# Patient Record
Sex: FEMALE | Race: BLACK OR AFRICAN AMERICAN | NOT HISPANIC OR LATINO | URBAN - METROPOLITAN AREA
[De-identification: names, ages, dates, MRNs, and addresses within clinical notes are randomized per-mention and may not be internally consistent; named-entity substitution may affect disease eponyms.]

---

## 2021-01-01 ENCOUNTER — INPATIENT (INPATIENT)
Facility: HOSPITAL | Age: 86
LOS: 3 days | End: 2021-10-04
Attending: INTERNAL MEDICINE | Admitting: INTERNAL MEDICINE
Payer: COMMERCIAL

## 2021-01-01 VITALS
TEMPERATURE: 99 F | HEIGHT: 63 IN | HEART RATE: 113 BPM | WEIGHT: 162.04 LBS | OXYGEN SATURATION: 90 % | RESPIRATION RATE: 26 BRPM | SYSTOLIC BLOOD PRESSURE: 102 MMHG | DIASTOLIC BLOOD PRESSURE: 59 MMHG

## 2021-01-01 VITALS — HEART RATE: 85 BPM | OXYGEN SATURATION: 98 %

## 2021-01-01 DIAGNOSIS — Z45.2 ENCOUNTER FOR ADJUSTMENT AND MANAGEMENT OF VASCULAR ACCESS DEVICE: ICD-10-CM

## 2021-01-01 DIAGNOSIS — Z90.710 ACQUIRED ABSENCE OF BOTH CERVIX AND UTERUS: Chronic | ICD-10-CM

## 2021-01-01 LAB
A1C WITH ESTIMATED AVERAGE GLUCOSE RESULT: 6.5 % — HIGH (ref 4–5.6)
ALBUMIN SERPL ELPH-MCNC: 1.5 G/DL — LOW (ref 3.3–5)
ALBUMIN SERPL ELPH-MCNC: 1.5 G/DL — LOW (ref 3.3–5)
ALBUMIN SERPL ELPH-MCNC: 1.6 G/DL — LOW (ref 3.3–5)
ALP SERPL-CCNC: 203 U/L — HIGH (ref 40–120)
ALP SERPL-CCNC: 204 U/L — HIGH (ref 40–120)
ALP SERPL-CCNC: 208 U/L — HIGH (ref 40–120)
ALT FLD-CCNC: 17 U/L — SIGNIFICANT CHANGE UP (ref 12–78)
ALT FLD-CCNC: 17 U/L — SIGNIFICANT CHANGE UP (ref 12–78)
ALT FLD-CCNC: 19 U/L — SIGNIFICANT CHANGE UP (ref 12–78)
ANION GAP SERPL CALC-SCNC: 10 MMOL/L — SIGNIFICANT CHANGE UP (ref 5–17)
ANION GAP SERPL CALC-SCNC: 12 MMOL/L — SIGNIFICANT CHANGE UP (ref 5–17)
ANION GAP SERPL CALC-SCNC: 5 MMOL/L — SIGNIFICANT CHANGE UP (ref 5–17)
ANION GAP SERPL CALC-SCNC: 7 MMOL/L — SIGNIFICANT CHANGE UP (ref 5–17)
ANION GAP SERPL CALC-SCNC: 8 MMOL/L — SIGNIFICANT CHANGE UP (ref 5–17)
ANISOCYTOSIS BLD QL: SLIGHT — SIGNIFICANT CHANGE UP
APPEARANCE UR: CLEAR — SIGNIFICANT CHANGE UP
AST SERPL-CCNC: 37 U/L — SIGNIFICANT CHANGE UP (ref 15–37)
AST SERPL-CCNC: 38 U/L — HIGH (ref 15–37)
AST SERPL-CCNC: 48 U/L — HIGH (ref 15–37)
BACTERIA # UR AUTO: ABNORMAL
BASOPHILS # BLD AUTO: 0 K/UL — SIGNIFICANT CHANGE UP (ref 0–0.2)
BASOPHILS # BLD AUTO: 0.07 K/UL — SIGNIFICANT CHANGE UP (ref 0–0.2)
BASOPHILS # BLD AUTO: 0.11 K/UL — SIGNIFICANT CHANGE UP (ref 0–0.2)
BASOPHILS NFR BLD AUTO: 0 % — SIGNIFICANT CHANGE UP (ref 0–2)
BASOPHILS NFR BLD AUTO: 0.3 % — SIGNIFICANT CHANGE UP (ref 0–2)
BASOPHILS NFR BLD AUTO: 0.4 % — SIGNIFICANT CHANGE UP (ref 0–2)
BILIRUB SERPL-MCNC: 0.7 MG/DL — SIGNIFICANT CHANGE UP (ref 0.2–1.2)
BILIRUB SERPL-MCNC: 0.8 MG/DL — SIGNIFICANT CHANGE UP (ref 0.2–1.2)
BILIRUB SERPL-MCNC: 0.9 MG/DL — SIGNIFICANT CHANGE UP (ref 0.2–1.2)
BILIRUB UR-MCNC: NEGATIVE — SIGNIFICANT CHANGE UP
BUN SERPL-MCNC: 24 MG/DL — HIGH (ref 7–23)
BUN SERPL-MCNC: 25 MG/DL — HIGH (ref 7–23)
BUN SERPL-MCNC: 31 MG/DL — HIGH (ref 7–23)
BUN SERPL-MCNC: 37 MG/DL — HIGH (ref 7–23)
BUN SERPL-MCNC: 41 MG/DL — HIGH (ref 7–23)
CALCIUM SERPL-MCNC: 7.8 MG/DL — LOW (ref 8.5–10.1)
CALCIUM SERPL-MCNC: 8.9 MG/DL — SIGNIFICANT CHANGE UP (ref 8.5–10.1)
CALCIUM SERPL-MCNC: 9 MG/DL — SIGNIFICANT CHANGE UP (ref 8.5–10.1)
CALCIUM SERPL-MCNC: 9.1 MG/DL — SIGNIFICANT CHANGE UP (ref 8.5–10.1)
CALCIUM SERPL-MCNC: 9.4 MG/DL — SIGNIFICANT CHANGE UP (ref 8.5–10.1)
CHLORIDE SERPL-SCNC: 107 MMOL/L — SIGNIFICANT CHANGE UP (ref 96–108)
CHLORIDE SERPL-SCNC: 113 MMOL/L — HIGH (ref 96–108)
CHLORIDE SERPL-SCNC: 114 MMOL/L — HIGH (ref 96–108)
CHLORIDE SERPL-SCNC: 115 MMOL/L — HIGH (ref 96–108)
CHLORIDE SERPL-SCNC: 116 MMOL/L — HIGH (ref 96–108)
CO2 SERPL-SCNC: 14 MMOL/L — LOW (ref 22–31)
CO2 SERPL-SCNC: 14 MMOL/L — LOW (ref 22–31)
CO2 SERPL-SCNC: 16 MMOL/L — LOW (ref 22–31)
CO2 SERPL-SCNC: 16 MMOL/L — LOW (ref 22–31)
CO2 SERPL-SCNC: 19 MMOL/L — LOW (ref 22–31)
COLOR SPEC: YELLOW — SIGNIFICANT CHANGE UP
COMMENT - URINE: SIGNIFICANT CHANGE UP
COVID-19 SPIKE DOMAIN AB INTERP: NEGATIVE — SIGNIFICANT CHANGE UP
COVID-19 SPIKE DOMAIN ANTIBODY RESULT: 0.4 U/ML — SIGNIFICANT CHANGE UP
CREAT SERPL-MCNC: 0.99 MG/DL — SIGNIFICANT CHANGE UP (ref 0.5–1.3)
CREAT SERPL-MCNC: 1.05 MG/DL — SIGNIFICANT CHANGE UP (ref 0.5–1.3)
CREAT SERPL-MCNC: 2 MG/DL — HIGH (ref 0.5–1.3)
CREAT SERPL-MCNC: 2.27 MG/DL — HIGH (ref 0.5–1.3)
CREAT SERPL-MCNC: 2.61 MG/DL — HIGH (ref 0.5–1.3)
CULTURE RESULTS: NO GROWTH — SIGNIFICANT CHANGE UP
DIFF PNL FLD: NEGATIVE — SIGNIFICANT CHANGE UP
EOSINOPHIL # BLD AUTO: 0 K/UL — SIGNIFICANT CHANGE UP (ref 0–0.5)
EOSINOPHIL # BLD AUTO: 0.01 K/UL — SIGNIFICANT CHANGE UP (ref 0–0.5)
EOSINOPHIL # BLD AUTO: 0.06 K/UL — SIGNIFICANT CHANGE UP (ref 0–0.5)
EOSINOPHIL NFR BLD AUTO: 0 % — SIGNIFICANT CHANGE UP (ref 0–6)
EOSINOPHIL NFR BLD AUTO: 0 % — SIGNIFICANT CHANGE UP (ref 0–6)
EOSINOPHIL NFR BLD AUTO: 0.3 % — SIGNIFICANT CHANGE UP (ref 0–6)
EPI CELLS # UR: SIGNIFICANT CHANGE UP
ESTIMATED AVERAGE GLUCOSE: 140 MG/DL — HIGH (ref 68–114)
FERRITIN SERPL-MCNC: 175 NG/ML — HIGH (ref 15–150)
FOLATE SERPL-MCNC: 7.8 NG/ML — SIGNIFICANT CHANGE UP
GLUCOSE BLDC GLUCOMTR-MCNC: 143 MG/DL — HIGH (ref 70–99)
GLUCOSE BLDC GLUCOMTR-MCNC: 164 MG/DL — HIGH (ref 70–99)
GLUCOSE BLDC GLUCOMTR-MCNC: 172 MG/DL — HIGH (ref 70–99)
GLUCOSE BLDC GLUCOMTR-MCNC: 173 MG/DL — HIGH (ref 70–99)
GLUCOSE BLDC GLUCOMTR-MCNC: 186 MG/DL — HIGH (ref 70–99)
GLUCOSE BLDC GLUCOMTR-MCNC: 209 MG/DL — HIGH (ref 70–99)
GLUCOSE BLDC GLUCOMTR-MCNC: 215 MG/DL — HIGH (ref 70–99)
GLUCOSE BLDC GLUCOMTR-MCNC: 215 MG/DL — HIGH (ref 70–99)
GLUCOSE BLDC GLUCOMTR-MCNC: 221 MG/DL — HIGH (ref 70–99)
GLUCOSE BLDC GLUCOMTR-MCNC: 223 MG/DL — HIGH (ref 70–99)
GLUCOSE BLDC GLUCOMTR-MCNC: 226 MG/DL — HIGH (ref 70–99)
GLUCOSE BLDC GLUCOMTR-MCNC: 227 MG/DL — HIGH (ref 70–99)
GLUCOSE BLDC GLUCOMTR-MCNC: 231 MG/DL — HIGH (ref 70–99)
GLUCOSE BLDC GLUCOMTR-MCNC: 255 MG/DL — HIGH (ref 70–99)
GLUCOSE BLDC GLUCOMTR-MCNC: 279 MG/DL — HIGH (ref 70–99)
GLUCOSE BLDC GLUCOMTR-MCNC: 302 MG/DL — HIGH (ref 70–99)
GLUCOSE BLDC GLUCOMTR-MCNC: 334 MG/DL — HIGH (ref 70–99)
GLUCOSE SERPL-MCNC: 131 MG/DL — HIGH (ref 70–99)
GLUCOSE SERPL-MCNC: 223 MG/DL — HIGH (ref 70–99)
GLUCOSE SERPL-MCNC: 244 MG/DL — HIGH (ref 70–99)
GLUCOSE SERPL-MCNC: 309 MG/DL — HIGH (ref 70–99)
GLUCOSE SERPL-MCNC: 484 MG/DL — CRITICAL HIGH (ref 70–99)
GLUCOSE UR QL: NEGATIVE MG/DL — SIGNIFICANT CHANGE UP
HCT VFR BLD CALC: 24.6 % — LOW (ref 34.5–45)
HCT VFR BLD CALC: 25.1 % — LOW (ref 34.5–45)
HCT VFR BLD CALC: 26.1 % — LOW (ref 34.5–45)
HCT VFR BLD CALC: 26.6 % — LOW (ref 34.5–45)
HGB BLD-MCNC: 7.3 G/DL — LOW (ref 11.5–15.5)
HGB BLD-MCNC: 7.3 G/DL — LOW (ref 11.5–15.5)
HGB BLD-MCNC: 7.8 G/DL — LOW (ref 11.5–15.5)
HGB BLD-MCNC: 8 G/DL — LOW (ref 11.5–15.5)
HYPOCHROMIA BLD QL: SLIGHT — SIGNIFICANT CHANGE UP
IMM GRANULOCYTES NFR BLD AUTO: 1.9 % — HIGH (ref 0–1.5)
IMM GRANULOCYTES NFR BLD AUTO: 2.1 % — HIGH (ref 0–1.5)
IRON SATN MFR SERPL: 12 % — LOW (ref 14–50)
IRON SATN MFR SERPL: 24 UG/DL — LOW (ref 30–160)
KETONES UR-MCNC: NEGATIVE — SIGNIFICANT CHANGE UP
LACTATE SERPL-SCNC: 1.6 MMOL/L — SIGNIFICANT CHANGE UP (ref 0.7–2)
LACTATE SERPL-SCNC: 2.9 MMOL/L — HIGH (ref 0.7–2)
LEUKOCYTE ESTERASE UR-ACNC: NEGATIVE — SIGNIFICANT CHANGE UP
LYMPHOCYTES # BLD AUTO: 14.45 K/UL — HIGH (ref 1–3.3)
LYMPHOCYTES # BLD AUTO: 31 % — SIGNIFICANT CHANGE UP (ref 13–44)
LYMPHOCYTES # BLD AUTO: 4.13 K/UL — HIGH (ref 1–3.3)
LYMPHOCYTES # BLD AUTO: 44.8 % — HIGH (ref 13–44)
LYMPHOCYTES # BLD AUTO: 45 % — HIGH (ref 13–44)
LYMPHOCYTES # BLD AUTO: 8.12 K/UL — HIGH (ref 1–3.3)
MAGNESIUM SERPL-MCNC: 1.6 MG/DL — SIGNIFICANT CHANGE UP (ref 1.6–2.6)
MAGNESIUM SERPL-MCNC: 1.9 MG/DL — SIGNIFICANT CHANGE UP (ref 1.6–2.6)
MAGNESIUM SERPL-MCNC: 1.9 MG/DL — SIGNIFICANT CHANGE UP (ref 1.6–2.6)
MAGNESIUM SERPL-MCNC: 2.2 MG/DL — SIGNIFICANT CHANGE UP (ref 1.6–2.6)
MANUAL SMEAR VERIFICATION: SIGNIFICANT CHANGE UP
MCHC RBC-ENTMCNC: 25.2 PG — LOW (ref 27–34)
MCHC RBC-ENTMCNC: 25.2 PG — LOW (ref 27–34)
MCHC RBC-ENTMCNC: 25.3 PG — LOW (ref 27–34)
MCHC RBC-ENTMCNC: 25.4 PG — LOW (ref 27–34)
MCHC RBC-ENTMCNC: 29.1 GM/DL — LOW (ref 32–36)
MCHC RBC-ENTMCNC: 29.7 GM/DL — LOW (ref 32–36)
MCHC RBC-ENTMCNC: 29.9 GM/DL — LOW (ref 32–36)
MCHC RBC-ENTMCNC: 30.1 GM/DL — LOW (ref 32–36)
MCV RBC AUTO: 83.6 FL — SIGNIFICANT CHANGE UP (ref 80–100)
MCV RBC AUTO: 84.2 FL — SIGNIFICANT CHANGE UP (ref 80–100)
MCV RBC AUTO: 85.1 FL — SIGNIFICANT CHANGE UP (ref 80–100)
MCV RBC AUTO: 87.5 FL — SIGNIFICANT CHANGE UP (ref 80–100)
MONOCYTES # BLD AUTO: 0.53 K/UL — SIGNIFICANT CHANGE UP (ref 0–0.9)
MONOCYTES # BLD AUTO: 1.62 K/UL — HIGH (ref 0–0.9)
MONOCYTES # BLD AUTO: 3.68 K/UL — HIGH (ref 0–0.9)
MONOCYTES NFR BLD AUTO: 11.5 % — SIGNIFICANT CHANGE UP (ref 2–14)
MONOCYTES NFR BLD AUTO: 4 % — SIGNIFICANT CHANGE UP (ref 2–14)
MONOCYTES NFR BLD AUTO: 8.9 % — SIGNIFICANT CHANGE UP (ref 2–14)
NEUTROPHILS # BLD AUTO: 13.18 K/UL — HIGH (ref 1.8–7.4)
NEUTROPHILS # BLD AUTO: 7.9 K/UL — HIGH (ref 1.8–7.4)
NEUTROPHILS # BLD AUTO: 8.66 K/UL — HIGH (ref 1.8–7.4)
NEUTROPHILS NFR BLD AUTO: 41.1 % — LOW (ref 43–77)
NEUTROPHILS NFR BLD AUTO: 43.7 % — SIGNIFICANT CHANGE UP (ref 43–77)
NEUTROPHILS NFR BLD AUTO: 62 % — SIGNIFICANT CHANGE UP (ref 43–77)
NEUTS BAND # BLD: 3 % — SIGNIFICANT CHANGE UP (ref 0–8)
NITRITE UR-MCNC: NEGATIVE — SIGNIFICANT CHANGE UP
NRBC # BLD: 1 /100 — HIGH (ref 0–0)
NRBC # BLD: 2 /100 WBCS — HIGH (ref 0–0)
NRBC # BLD: 4 /100 WBCS — HIGH (ref 0–0)
NRBC # BLD: 5 /100 WBCS — HIGH (ref 0–0)
NRBC # BLD: SIGNIFICANT CHANGE UP /100 WBCS (ref 0–0)
NT-PROBNP SERPL-SCNC: 3279 PG/ML — HIGH (ref 0–450)
OB PNL STL: NEGATIVE — SIGNIFICANT CHANGE UP
OB PNL STL: POSITIVE
PH UR: 5 — SIGNIFICANT CHANGE UP (ref 5–8)
PHOSPHATE SERPL-MCNC: 4.2 MG/DL — SIGNIFICANT CHANGE UP (ref 2.5–4.5)
PHOSPHATE SERPL-MCNC: 5.6 MG/DL — HIGH (ref 2.5–4.5)
PHOSPHATE SERPL-MCNC: 7.1 MG/DL — HIGH (ref 2.5–4.5)
PLAT MORPH BLD: NORMAL — SIGNIFICANT CHANGE UP
PLATELET # BLD AUTO: 103 K/UL — LOW (ref 150–400)
PLATELET # BLD AUTO: 88 K/UL — LOW (ref 150–400)
PLATELET # BLD AUTO: 89 K/UL — LOW (ref 150–400)
PLATELET # BLD AUTO: 90 K/UL — LOW (ref 150–400)
PLATELET COUNT - ESTIMATE: ABNORMAL
POLYCHROMASIA BLD QL SMEAR: SLIGHT — SIGNIFICANT CHANGE UP
POTASSIUM SERPL-MCNC: 4.6 MMOL/L — SIGNIFICANT CHANGE UP (ref 3.5–5.3)
POTASSIUM SERPL-MCNC: 5 MMOL/L — SIGNIFICANT CHANGE UP (ref 3.5–5.3)
POTASSIUM SERPL-MCNC: 5.4 MMOL/L — HIGH (ref 3.5–5.3)
POTASSIUM SERPL-MCNC: 5.4 MMOL/L — HIGH (ref 3.5–5.3)
POTASSIUM SERPL-MCNC: 6 MMOL/L — HIGH (ref 3.5–5.3)
POTASSIUM SERPL-MCNC: 6.4 MMOL/L — CRITICAL HIGH (ref 3.5–5.3)
POTASSIUM SERPL-SCNC: 4.6 MMOL/L — SIGNIFICANT CHANGE UP (ref 3.5–5.3)
POTASSIUM SERPL-SCNC: 5 MMOL/L — SIGNIFICANT CHANGE UP (ref 3.5–5.3)
POTASSIUM SERPL-SCNC: 5.4 MMOL/L — HIGH (ref 3.5–5.3)
POTASSIUM SERPL-SCNC: 5.4 MMOL/L — HIGH (ref 3.5–5.3)
POTASSIUM SERPL-SCNC: 6 MMOL/L — HIGH (ref 3.5–5.3)
POTASSIUM SERPL-SCNC: 6.4 MMOL/L — CRITICAL HIGH (ref 3.5–5.3)
PROT SERPL-MCNC: 5.1 GM/DL — LOW (ref 6–8.3)
PROT SERPL-MCNC: 5.1 GM/DL — LOW (ref 6–8.3)
PROT SERPL-MCNC: 5.4 GM/DL — LOW (ref 6–8.3)
PROT UR-MCNC: 30 MG/DL
RAPID RVP RESULT: SIGNIFICANT CHANGE UP
RBC # BLD: 2.87 M/UL — LOW (ref 3.8–5.2)
RBC # BLD: 2.89 M/UL — LOW (ref 3.8–5.2)
RBC # BLD: 2.89 M/UL — LOW (ref 3.8–5.2)
RBC # BLD: 3.1 M/UL — LOW (ref 3.8–5.2)
RBC # BLD: 3.18 M/UL — LOW (ref 3.8–5.2)
RBC # FLD: 22.4 % — HIGH (ref 10.3–14.5)
RBC # FLD: 22.5 % — HIGH (ref 10.3–14.5)
RBC BLD AUTO: ABNORMAL
RBC CASTS # UR COMP ASSIST: NEGATIVE /HPF — SIGNIFICANT CHANGE UP (ref 0–4)
RETICS #: 117.6 K/UL — SIGNIFICANT CHANGE UP (ref 25–125)
RETICS/RBC NFR: 4.1 % — HIGH (ref 0.5–2.5)
SARS-COV-2 IGG+IGM SERPL QL IA: 0.4 U/ML — SIGNIFICANT CHANGE UP
SARS-COV-2 IGG+IGM SERPL QL IA: NEGATIVE — SIGNIFICANT CHANGE UP
SARS-COV-2 RNA SPEC QL NAA+PROBE: SIGNIFICANT CHANGE UP
SODIUM SERPL-SCNC: 133 MMOL/L — LOW (ref 135–145)
SODIUM SERPL-SCNC: 137 MMOL/L — SIGNIFICANT CHANGE UP (ref 135–145)
SODIUM SERPL-SCNC: 138 MMOL/L — SIGNIFICANT CHANGE UP (ref 135–145)
SODIUM SERPL-SCNC: 139 MMOL/L — SIGNIFICANT CHANGE UP (ref 135–145)
SODIUM SERPL-SCNC: 139 MMOL/L — SIGNIFICANT CHANGE UP (ref 135–145)
SP GR SPEC: 1.01 — SIGNIFICANT CHANGE UP (ref 1.01–1.02)
SPECIMEN SOURCE: SIGNIFICANT CHANGE UP
TIBC SERPL-MCNC: 199 UG/DL — LOW (ref 220–430)
TROPONIN I SERPL-MCNC: 0.07 NG/ML — HIGH (ref 0.01–0.04)
TROPONIN I SERPL-MCNC: 0.1 NG/ML — HIGH (ref 0.01–0.04)
TROPONIN I SERPL-MCNC: 0.15 NG/ML — HIGH (ref 0.01–0.04)
TROPONIN I SERPL-MCNC: 0.18 NG/ML — HIGH (ref 0.01–0.04)
UIBC SERPL-MCNC: 175 UG/DL — SIGNIFICANT CHANGE UP (ref 110–370)
URATE SERPL-MCNC: 9.4 MG/DL — HIGH (ref 2.5–7)
UROBILINOGEN FLD QL: 1 MG/DL
VIT B12 SERPL-MCNC: 1499 PG/ML — HIGH (ref 232–1245)
WBC # BLD: 13.33 K/UL — HIGH (ref 3.8–10.5)
WBC # BLD: 18.11 K/UL — HIGH (ref 3.8–10.5)
WBC # BLD: 19.87 K/UL — HIGH (ref 3.8–10.5)
WBC # BLD: 32.12 K/UL — HIGH (ref 3.8–10.5)
WBC # FLD AUTO: 13.33 K/UL — HIGH (ref 3.8–10.5)
WBC # FLD AUTO: 18.11 K/UL — HIGH (ref 3.8–10.5)
WBC # FLD AUTO: 19.87 K/UL — HIGH (ref 3.8–10.5)
WBC # FLD AUTO: 32.12 K/UL — HIGH (ref 3.8–10.5)
WBC UR QL: SIGNIFICANT CHANGE UP

## 2021-01-01 PROCEDURE — 99223 1ST HOSP IP/OBS HIGH 75: CPT

## 2021-01-01 PROCEDURE — 99291 CRITICAL CARE FIRST HOUR: CPT

## 2021-01-01 PROCEDURE — 99232 SBSQ HOSP IP/OBS MODERATE 35: CPT

## 2021-01-01 PROCEDURE — 71260 CT THORAX DX C+: CPT | Mod: 26,MA

## 2021-01-01 PROCEDURE — 99239 HOSP IP/OBS DSCHRG MGMT >30: CPT

## 2021-01-01 PROCEDURE — 93010 ELECTROCARDIOGRAM REPORT: CPT

## 2021-01-01 PROCEDURE — 71045 X-RAY EXAM CHEST 1 VIEW: CPT | Mod: 26

## 2021-01-01 PROCEDURE — 74177 CT ABD & PELVIS W/CONTRAST: CPT | Mod: 26,MA

## 2021-01-01 PROCEDURE — 93306 TTE W/DOPPLER COMPLETE: CPT | Mod: 26

## 2021-01-01 RX ORDER — INSULIN LISPRO 100/ML
VIAL (ML) SUBCUTANEOUS EVERY 6 HOURS
Refills: 0 | Status: DISCONTINUED | OUTPATIENT
Start: 2021-01-01 | End: 2021-01-01

## 2021-01-01 RX ORDER — SODIUM CHLORIDE 9 MG/ML
1000 INJECTION, SOLUTION INTRAVENOUS
Refills: 0 | Status: DISCONTINUED | OUTPATIENT
Start: 2021-01-01 | End: 2021-01-01

## 2021-01-01 RX ORDER — KETOROLAC TROMETHAMINE 30 MG/ML
15 SYRINGE (ML) INJECTION ONCE
Refills: 0 | Status: DISCONTINUED | OUTPATIENT
Start: 2021-01-01 | End: 2021-01-01

## 2021-01-01 RX ORDER — COLLAGENASE CLOSTRIDIUM HIST. 250 UNIT/G
1 OINTMENT (GRAM) TOPICAL DAILY
Refills: 0 | Status: DISCONTINUED | OUTPATIENT
Start: 2021-01-01 | End: 2021-01-01

## 2021-01-01 RX ORDER — ALLOPURINOL 300 MG
100 TABLET ORAL DAILY
Refills: 0 | Status: DISCONTINUED | OUTPATIENT
Start: 2021-01-01 | End: 2021-01-01

## 2021-01-01 RX ORDER — INSULIN LISPRO 100/ML
VIAL (ML) SUBCUTANEOUS
Refills: 0 | Status: DISCONTINUED | OUTPATIENT
Start: 2021-01-01 | End: 2021-01-01

## 2021-01-01 RX ORDER — DEXTROSE 50 % IN WATER 50 %
25 SYRINGE (ML) INTRAVENOUS ONCE
Refills: 0 | Status: DISCONTINUED | OUTPATIENT
Start: 2021-01-01 | End: 2021-01-01

## 2021-01-01 RX ORDER — ACETAMINOPHEN 500 MG
650 TABLET ORAL EVERY 6 HOURS
Refills: 0 | Status: DISCONTINUED | OUTPATIENT
Start: 2021-01-01 | End: 2021-01-01

## 2021-01-01 RX ORDER — SITAGLIPTIN 50 MG/1
0 TABLET, FILM COATED ORAL
Qty: 0 | Refills: 0 | DISCHARGE

## 2021-01-01 RX ORDER — DEXTROSE 50 % IN WATER 50 %
12.5 SYRINGE (ML) INTRAVENOUS ONCE
Refills: 0 | Status: DISCONTINUED | OUTPATIENT
Start: 2021-01-01 | End: 2021-01-01

## 2021-01-01 RX ORDER — MEROPENEM 1 G/30ML
1000 INJECTION INTRAVENOUS EVERY 24 HOURS
Refills: 0 | Status: DISCONTINUED | OUTPATIENT
Start: 2021-01-01 | End: 2021-01-01

## 2021-01-01 RX ORDER — ALBUTEROL 90 UG/1
2 AEROSOL, METERED ORAL EVERY 6 HOURS
Refills: 0 | Status: DISCONTINUED | OUTPATIENT
Start: 2021-01-01 | End: 2021-01-01

## 2021-01-01 RX ORDER — ALBUTEROL 90 UG/1
2 AEROSOL, METERED ORAL ONCE
Refills: 0 | Status: COMPLETED | OUTPATIENT
Start: 2021-01-01 | End: 2021-01-01

## 2021-01-01 RX ORDER — ACETAMINOPHEN 500 MG
1000 TABLET ORAL ONCE
Refills: 0 | Status: COMPLETED | OUTPATIENT
Start: 2021-01-01 | End: 2021-01-01

## 2021-01-01 RX ORDER — SODIUM POLYSTYRENE SULFONATE 4.1 MEQ/G
30 POWDER, FOR SUSPENSION ORAL ONCE
Refills: 0 | Status: COMPLETED | OUTPATIENT
Start: 2021-01-01 | End: 2021-01-01

## 2021-01-01 RX ORDER — SCOPALAMINE 1 MG/3D
1 PATCH, EXTENDED RELEASE TRANSDERMAL
Refills: 0 | Status: DISCONTINUED | OUTPATIENT
Start: 2021-01-01 | End: 2021-01-01

## 2021-01-01 RX ORDER — FUROSEMIDE 40 MG
40 TABLET ORAL ONCE
Refills: 0 | Status: COMPLETED | OUTPATIENT
Start: 2021-01-01 | End: 2021-01-01

## 2021-01-01 RX ORDER — SODIUM POLYSTYRENE SULFONATE 4.1 MEQ/G
30 POWDER, FOR SUSPENSION ORAL ONCE
Refills: 0 | Status: DISCONTINUED | OUTPATIENT
Start: 2021-01-01 | End: 2021-01-01

## 2021-01-01 RX ORDER — FUROSEMIDE 40 MG
40 TABLET ORAL DAILY
Refills: 0 | Status: DISCONTINUED | OUTPATIENT
Start: 2021-01-01 | End: 2021-01-01

## 2021-01-01 RX ORDER — MEROPENEM 1 G/30ML
INJECTION INTRAVENOUS
Refills: 0 | Status: DISCONTINUED | OUTPATIENT
Start: 2021-01-01 | End: 2021-01-01

## 2021-01-01 RX ORDER — SODIUM ZIRCONIUM CYCLOSILICATE 10 G/10G
10 POWDER, FOR SUSPENSION ORAL THREE TIMES A DAY
Refills: 0 | Status: DISCONTINUED | OUTPATIENT
Start: 2021-01-01 | End: 2021-01-01

## 2021-01-01 RX ORDER — RISPERIDONE 4 MG/1
0 TABLET ORAL
Qty: 0 | Refills: 0 | DISCHARGE

## 2021-01-01 RX ORDER — VALSARTAN 80 MG/1
0 TABLET ORAL
Qty: 0 | Refills: 0 | DISCHARGE

## 2021-01-01 RX ORDER — INFLUENZA VIRUS VACCINE 15; 15; 15; 15 UG/.5ML; UG/.5ML; UG/.5ML; UG/.5ML
0.5 SUSPENSION INTRAMUSCULAR ONCE
Refills: 0 | Status: DISCONTINUED | OUTPATIENT
Start: 2021-01-01 | End: 2021-01-01

## 2021-01-01 RX ORDER — PIPERACILLIN AND TAZOBACTAM 4; .5 G/20ML; G/20ML
3.38 INJECTION, POWDER, LYOPHILIZED, FOR SOLUTION INTRAVENOUS ONCE
Refills: 0 | Status: COMPLETED | OUTPATIENT
Start: 2021-01-01 | End: 2021-01-01

## 2021-01-01 RX ORDER — PIPERACILLIN AND TAZOBACTAM 4; .5 G/20ML; G/20ML
3.38 INJECTION, POWDER, LYOPHILIZED, FOR SOLUTION INTRAVENOUS EVERY 8 HOURS
Refills: 0 | Status: DISCONTINUED | OUTPATIENT
Start: 2021-01-01 | End: 2021-01-01

## 2021-01-01 RX ORDER — ERYTHROPOIETIN 10000 [IU]/ML
10000 INJECTION, SOLUTION INTRAVENOUS; SUBCUTANEOUS
Refills: 0 | Status: DISCONTINUED | OUTPATIENT
Start: 2021-01-01 | End: 2021-01-01

## 2021-01-01 RX ORDER — DEXTROSE 50 % IN WATER 50 %
25 SYRINGE (ML) INTRAVENOUS ONCE
Refills: 0 | Status: COMPLETED | OUTPATIENT
Start: 2021-01-01 | End: 2021-01-01

## 2021-01-01 RX ORDER — DEXTROSE 50 % IN WATER 50 %
50 SYRINGE (ML) INTRAVENOUS ONCE
Refills: 0 | Status: COMPLETED | OUTPATIENT
Start: 2021-01-01 | End: 2021-01-01

## 2021-01-01 RX ORDER — MORPHINE SULFATE 50 MG/1
2 CAPSULE, EXTENDED RELEASE ORAL
Refills: 0 | Status: DISCONTINUED | OUTPATIENT
Start: 2021-01-01 | End: 2021-01-01

## 2021-01-01 RX ORDER — INSULIN HUMAN 100 [IU]/ML
10 INJECTION, SOLUTION SUBCUTANEOUS ONCE
Refills: 0 | Status: DISCONTINUED | OUTPATIENT
Start: 2021-01-01 | End: 2021-01-01

## 2021-01-01 RX ORDER — NYSTATIN CREAM 100000 [USP'U]/G
1 CREAM TOPICAL
Refills: 0 | Status: DISCONTINUED | OUTPATIENT
Start: 2021-01-01 | End: 2021-01-01

## 2021-01-01 RX ORDER — FUROSEMIDE 40 MG
40 TABLET ORAL ONCE
Refills: 0 | Status: DISCONTINUED | OUTPATIENT
Start: 2021-01-01 | End: 2021-01-01

## 2021-01-01 RX ORDER — SODIUM ZIRCONIUM CYCLOSILICATE 10 G/10G
10 POWDER, FOR SUSPENSION ORAL ONCE
Refills: 0 | Status: COMPLETED | OUTPATIENT
Start: 2021-01-01 | End: 2021-01-01

## 2021-01-01 RX ORDER — INSULIN LISPRO 100/ML
VIAL (ML) SUBCUTANEOUS AT BEDTIME
Refills: 0 | Status: DISCONTINUED | OUTPATIENT
Start: 2021-01-01 | End: 2021-01-01

## 2021-01-01 RX ORDER — LACTULOSE 10 G/15ML
200 SOLUTION ORAL ONCE
Refills: 0 | Status: COMPLETED | OUTPATIENT
Start: 2021-01-01 | End: 2021-01-01

## 2021-01-01 RX ORDER — FAMOTIDINE 10 MG/ML
20 INJECTION INTRAVENOUS DAILY
Refills: 0 | Status: DISCONTINUED | OUTPATIENT
Start: 2021-01-01 | End: 2021-01-01

## 2021-01-01 RX ORDER — MEROPENEM 1 G/30ML
1000 INJECTION INTRAVENOUS ONCE
Refills: 0 | Status: COMPLETED | OUTPATIENT
Start: 2021-01-01 | End: 2021-01-01

## 2021-01-01 RX ORDER — DEXTROSE 50 % IN WATER 50 %
15 SYRINGE (ML) INTRAVENOUS ONCE
Refills: 0 | Status: DISCONTINUED | OUTPATIENT
Start: 2021-01-01 | End: 2021-01-01

## 2021-01-01 RX ORDER — CALCIUM GLUCONATE 100 MG/ML
1 VIAL (ML) INTRAVENOUS ONCE
Refills: 0 | Status: COMPLETED | OUTPATIENT
Start: 2021-01-01 | End: 2021-01-01

## 2021-01-01 RX ORDER — MELOXICAM 15 MG/1
0 TABLET ORAL
Qty: 0 | Refills: 0 | DISCHARGE

## 2021-01-01 RX ORDER — ONDANSETRON 8 MG/1
4 TABLET, FILM COATED ORAL EVERY 8 HOURS
Refills: 0 | Status: DISCONTINUED | OUTPATIENT
Start: 2021-01-01 | End: 2021-01-01

## 2021-01-01 RX ORDER — INSULIN GLARGINE 100 [IU]/ML
0 INJECTION, SOLUTION SUBCUTANEOUS
Qty: 0 | Refills: 5 | DISCHARGE

## 2021-01-01 RX ORDER — MORPHINE SULFATE 50 MG/1
1 CAPSULE, EXTENDED RELEASE ORAL EVERY 4 HOURS
Refills: 0 | Status: DISCONTINUED | OUTPATIENT
Start: 2021-01-01 | End: 2021-01-01

## 2021-01-01 RX ORDER — SODIUM CHLORIDE 9 MG/ML
2250 INJECTION INTRAMUSCULAR; INTRAVENOUS; SUBCUTANEOUS ONCE
Refills: 0 | Status: COMPLETED | OUTPATIENT
Start: 2021-01-01 | End: 2021-01-01

## 2021-01-01 RX ORDER — COLLAGENASE CLOSTRIDIUM HIST. 250 UNIT/G
0 OINTMENT (GRAM) TOPICAL
Qty: 0 | Refills: 0 | DISCHARGE

## 2021-01-01 RX ORDER — AMOXICILLIN 250 MG/5ML
0 SUSPENSION, RECONSTITUTED, ORAL (ML) ORAL
Qty: 0 | Refills: 0 | DISCHARGE

## 2021-01-01 RX ORDER — HEPARIN SODIUM 5000 [USP'U]/ML
5000 INJECTION INTRAVENOUS; SUBCUTANEOUS EVERY 12 HOURS
Refills: 0 | Status: DISCONTINUED | OUTPATIENT
Start: 2021-01-01 | End: 2021-01-01

## 2021-01-01 RX ORDER — PIPERACILLIN AND TAZOBACTAM 4; .5 G/20ML; G/20ML
3.38 INJECTION, POWDER, LYOPHILIZED, FOR SOLUTION INTRAVENOUS ONCE
Refills: 0 | Status: DISCONTINUED | OUTPATIENT
Start: 2021-01-01 | End: 2021-01-01

## 2021-01-01 RX ORDER — FAMOTIDINE 10 MG/ML
0 INJECTION INTRAVENOUS
Qty: 0 | Refills: 0 | DISCHARGE

## 2021-01-01 RX ORDER — INSULIN HUMAN 100 [IU]/ML
10 INJECTION, SOLUTION SUBCUTANEOUS ONCE
Refills: 0 | Status: COMPLETED | OUTPATIENT
Start: 2021-01-01 | End: 2021-01-01

## 2021-01-01 RX ORDER — RISPERIDONE 4 MG/1
0.5 TABLET ORAL AT BEDTIME
Refills: 0 | Status: DISCONTINUED | OUTPATIENT
Start: 2021-01-01 | End: 2021-01-01

## 2021-01-01 RX ORDER — SODIUM BICARBONATE 1 MEQ/ML
650 SYRINGE (ML) INTRAVENOUS THREE TIMES A DAY
Refills: 0 | Status: DISCONTINUED | OUTPATIENT
Start: 2021-01-01 | End: 2021-01-01

## 2021-01-01 RX ORDER — GLUCAGON INJECTION, SOLUTION 0.5 MG/.1ML
1 INJECTION, SOLUTION SUBCUTANEOUS ONCE
Refills: 0 | Status: DISCONTINUED | OUTPATIENT
Start: 2021-01-01 | End: 2021-01-01

## 2021-01-01 RX ORDER — INSULIN HUMAN 100 [IU]/ML
5 INJECTION, SOLUTION SUBCUTANEOUS ONCE
Refills: 0 | Status: COMPLETED | OUTPATIENT
Start: 2021-01-01 | End: 2021-01-01

## 2021-01-01 RX ADMIN — Medication 2: at 17:05

## 2021-01-01 RX ADMIN — ALBUTEROL 2 PUFF(S): 90 AEROSOL, METERED ORAL at 17:07

## 2021-01-01 RX ADMIN — ALBUTEROL 2 PUFF(S): 90 AEROSOL, METERED ORAL at 00:35

## 2021-01-01 RX ADMIN — PIPERACILLIN AND TAZOBACTAM 25 GRAM(S): 4; .5 INJECTION, POWDER, LYOPHILIZED, FOR SOLUTION INTRAVENOUS at 06:04

## 2021-01-01 RX ADMIN — Medication 2: at 08:19

## 2021-01-01 RX ADMIN — SODIUM POLYSTYRENE SULFONATE 30 GRAM(S): 4.1 POWDER, FOR SUSPENSION ORAL at 21:05

## 2021-01-01 RX ADMIN — Medication 2: at 08:21

## 2021-01-01 RX ADMIN — RISPERIDONE 0.5 MILLIGRAM(S): 4 TABLET ORAL at 21:06

## 2021-01-01 RX ADMIN — PIPERACILLIN AND TAZOBACTAM 3.38 GRAM(S): 4; .5 INJECTION, POWDER, LYOPHILIZED, FOR SOLUTION INTRAVENOUS at 23:05

## 2021-01-01 RX ADMIN — NYSTATIN CREAM 1 APPLICATION(S): 100000 CREAM TOPICAL at 17:06

## 2021-01-01 RX ADMIN — PIPERACILLIN AND TAZOBACTAM 25 GRAM(S): 4; .5 INJECTION, POWDER, LYOPHILIZED, FOR SOLUTION INTRAVENOUS at 05:15

## 2021-01-01 RX ADMIN — Medication 40 MILLIGRAM(S): at 10:18

## 2021-01-01 RX ADMIN — FAMOTIDINE 20 MILLIGRAM(S): 10 INJECTION INTRAVENOUS at 11:56

## 2021-01-01 RX ADMIN — Medication 650 MILLIGRAM(S): at 22:34

## 2021-01-01 RX ADMIN — Medication 2: at 12:01

## 2021-01-01 RX ADMIN — NYSTATIN CREAM 1 APPLICATION(S): 100000 CREAM TOPICAL at 17:05

## 2021-01-01 RX ADMIN — PIPERACILLIN AND TAZOBACTAM 25 GRAM(S): 4; .5 INJECTION, POWDER, LYOPHILIZED, FOR SOLUTION INTRAVENOUS at 13:03

## 2021-01-01 RX ADMIN — FAMOTIDINE 20 MILLIGRAM(S): 10 INJECTION INTRAVENOUS at 12:02

## 2021-01-01 RX ADMIN — Medication 4: at 16:47

## 2021-01-01 RX ADMIN — Medication 15 MILLIGRAM(S): at 00:05

## 2021-01-01 RX ADMIN — HEPARIN SODIUM 5000 UNIT(S): 5000 INJECTION INTRAVENOUS; SUBCUTANEOUS at 06:11

## 2021-01-01 RX ADMIN — PIPERACILLIN AND TAZOBACTAM 25 GRAM(S): 4; .5 INJECTION, POWDER, LYOPHILIZED, FOR SOLUTION INTRAVENOUS at 21:10

## 2021-01-01 RX ADMIN — HEPARIN SODIUM 5000 UNIT(S): 5000 INJECTION INTRAVENOUS; SUBCUTANEOUS at 17:06

## 2021-01-01 RX ADMIN — Medication 400 MILLIGRAM(S): at 23:40

## 2021-01-01 RX ADMIN — Medication 100 GRAM(S): at 13:09

## 2021-01-01 RX ADMIN — HEPARIN SODIUM 5000 UNIT(S): 5000 INJECTION INTRAVENOUS; SUBCUTANEOUS at 05:26

## 2021-01-01 RX ADMIN — Medication 1: at 21:10

## 2021-01-01 RX ADMIN — NYSTATIN CREAM 1 APPLICATION(S): 100000 CREAM TOPICAL at 05:24

## 2021-01-01 RX ADMIN — Medication 1: at 06:10

## 2021-01-01 RX ADMIN — NYSTATIN CREAM 1 APPLICATION(S): 100000 CREAM TOPICAL at 17:29

## 2021-01-01 RX ADMIN — SODIUM CHLORIDE 2250 MILLILITER(S): 9 INJECTION INTRAMUSCULAR; INTRAVENOUS; SUBCUTANEOUS at 19:41

## 2021-01-01 RX ADMIN — ALBUTEROL 2 PUFF(S): 90 AEROSOL, METERED ORAL at 11:55

## 2021-01-01 RX ADMIN — HEPARIN SODIUM 5000 UNIT(S): 5000 INJECTION INTRAVENOUS; SUBCUTANEOUS at 05:15

## 2021-01-01 RX ADMIN — Medication 1 APPLICATION(S): at 12:02

## 2021-01-01 RX ADMIN — Medication 40 MILLIGRAM(S): at 21:39

## 2021-01-01 RX ADMIN — Medication 2: at 11:54

## 2021-01-01 RX ADMIN — Medication 50 MILLILITER(S): at 22:26

## 2021-01-01 RX ADMIN — HEPARIN SODIUM 5000 UNIT(S): 5000 INJECTION INTRAVENOUS; SUBCUTANEOUS at 17:28

## 2021-01-01 RX ADMIN — RISPERIDONE 0.5 MILLIGRAM(S): 4 TABLET ORAL at 21:10

## 2021-01-01 RX ADMIN — NYSTATIN CREAM 1 APPLICATION(S): 100000 CREAM TOPICAL at 05:26

## 2021-01-01 RX ADMIN — HEPARIN SODIUM 5000 UNIT(S): 5000 INJECTION INTRAVENOUS; SUBCUTANEOUS at 17:05

## 2021-01-01 RX ADMIN — ALBUTEROL 2 PUFF(S): 90 AEROSOL, METERED ORAL at 17:28

## 2021-01-01 RX ADMIN — Medication 2: at 21:43

## 2021-01-01 RX ADMIN — Medication 650 MILLIGRAM(S): at 21:10

## 2021-01-01 RX ADMIN — INSULIN HUMAN 5 UNIT(S): 100 INJECTION, SOLUTION SUBCUTANEOUS at 22:26

## 2021-01-01 RX ADMIN — ALBUTEROL 2 PUFF(S): 90 AEROSOL, METERED ORAL at 23:47

## 2021-01-01 RX ADMIN — MORPHINE SULFATE 1 MILLIGRAM(S): 50 CAPSULE, EXTENDED RELEASE ORAL at 09:40

## 2021-01-01 RX ADMIN — NYSTATIN CREAM 1 APPLICATION(S): 100000 CREAM TOPICAL at 05:15

## 2021-01-01 RX ADMIN — PIPERACILLIN AND TAZOBACTAM 25 GRAM(S): 4; .5 INJECTION, POWDER, LYOPHILIZED, FOR SOLUTION INTRAVENOUS at 13:46

## 2021-01-01 RX ADMIN — INSULIN HUMAN 10 UNIT(S): 100 INJECTION, SOLUTION SUBCUTANEOUS at 13:08

## 2021-01-01 RX ADMIN — NYSTATIN CREAM 1 APPLICATION(S): 100000 CREAM TOPICAL at 06:01

## 2021-01-01 RX ADMIN — SODIUM POLYSTYRENE SULFONATE 30 GRAM(S): 4.1 POWDER, FOR SUSPENSION ORAL at 11:55

## 2021-01-01 RX ADMIN — Medication 25 GRAM(S): at 13:09

## 2021-01-01 RX ADMIN — Medication 650 MILLIGRAM(S): at 13:14

## 2021-01-01 RX ADMIN — ALBUTEROL 2 PUFF(S): 90 AEROSOL, METERED ORAL at 06:41

## 2021-01-01 RX ADMIN — PIPERACILLIN AND TAZOBACTAM 200 GRAM(S): 4; .5 INJECTION, POWDER, LYOPHILIZED, FOR SOLUTION INTRAVENOUS at 22:12

## 2021-01-01 RX ADMIN — PIPERACILLIN AND TAZOBACTAM 25 GRAM(S): 4; .5 INJECTION, POWDER, LYOPHILIZED, FOR SOLUTION INTRAVENOUS at 21:05

## 2021-01-01 RX ADMIN — MEROPENEM 100 MILLIGRAM(S): 1 INJECTION INTRAVENOUS at 14:23

## 2021-01-01 RX ADMIN — Medication 15 MILLIGRAM(S): at 23:47

## 2021-01-01 RX ADMIN — Medication 1 APPLICATION(S): at 11:55

## 2021-01-01 RX ADMIN — Medication 1 APPLICATION(S): at 11:56

## 2021-01-01 RX ADMIN — ALBUTEROL 2 PUFF(S): 90 AEROSOL, METERED ORAL at 05:48

## 2021-01-01 RX ADMIN — Medication 650 MILLIGRAM(S): at 22:32

## 2021-01-01 RX ADMIN — LACTULOSE 200 GRAM(S): 10 SOLUTION ORAL at 18:00

## 2021-01-01 RX ADMIN — Medication 15 MILLIGRAM(S): at 22:59

## 2021-01-01 RX ADMIN — PIPERACILLIN AND TAZOBACTAM 25 GRAM(S): 4; .5 INJECTION, POWDER, LYOPHILIZED, FOR SOLUTION INTRAVENOUS at 05:26

## 2021-01-01 RX ADMIN — ALBUTEROL 2 PUFF(S): 90 AEROSOL, METERED ORAL at 10:00

## 2021-01-01 RX ADMIN — Medication 40 MILLIGRAM(S): at 17:27

## 2021-01-01 RX ADMIN — SODIUM CHLORIDE 50 MILLILITER(S): 9 INJECTION, SOLUTION INTRAVENOUS at 23:30

## 2021-09-30 NOTE — ED PROVIDER NOTE - CLINICAL SUMMARY MEDICAL DECISION MAKING FREE TEXT BOX
Ddx: ro pna/ covid/ sepsis/ CHF/ obstruction  Plan: cbc, troponin, bnp, cxr, lactate, blood cx, ct abd, likely admit

## 2021-09-30 NOTE — ED PROVIDER NOTE - OBJECTIVE STATEMENT
Pt is a 89 yo lady with a pmhx of DM, HL who presents to the ED with congestion and sob. Has been going on for the past couple days, has a productive cough. No fevers, no chest pain. Not vaccinated against covid. Family says she is at baseline mental status now. No dysuria.
EOAE (evoked otoacoustic emission)

## 2021-09-30 NOTE — ED PROVIDER NOTE - CADM POA PRESS ULCER

## 2021-09-30 NOTE — ED ADULT NURSE NOTE - OBJECTIVE STATEMENT
Patient received via stretcher alert to name only v/s wnl, ,patient on 10l via nonrebreather due to difficulty breathing with sat of 58% on room air.  Patient skin warm, dry patient has stage two decubitus to sacral area cover with dressing from home. Patient labs and blood cultures x2 were drawn and sent 20g heploc was applied to right FA . Patient had bowel movement  with formed stool and was cleaned. Patient was put on cardiac monitor and is being monitored closely at present time.

## 2021-09-30 NOTE — H&P ADULT - NSICDXPASTMEDICALHX_GEN_ALL_CORE_FT
PAST MEDICAL HISTORY:  CVA (cerebrovascular accident)     Dementia     Diabetes mellitus     Hypertension

## 2021-09-30 NOTE — H&P ADULT - HISTORY OF PRESENT ILLNESS
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 Per Son Nelson, pt has had worsening SOB and congestion for the last few day. Pt also noted to c/o abdominal. Per son pt was constipated and he gave her a laxative and she has had a few episodes of nonbloody diarrhea. Per son pt more lethargic and he noted pt panting thus called EMS. No reported fever, chills, n/v. Pt confused and moaning the ED however responds yes when asked if she has abdominal pain. Per EMS on arrival SPO2 56% on RA thus placed on 100% NRB    In ED initial vitals /59, , pt afebrile. Labs sig for WBC 13.3, H/H 8/26.6, pt 103, LA 2.9, Trop 0.074, BNP 3279. Pt given Zosyn, 2.25 L IV bolus.

## 2021-09-30 NOTE — H&P ADULT - ASSESSMENT
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 Pt being admitted for hypoxia due to pleural effusions, possible acute CHF, NSTEMI, Sepsis due to acute colitis.    1) Sepsis secondary to acute colitis  - NPO; pt failed bedside swallow test  - pt s/p 2.25L IV bolus, hold further IVF given pleural effusions, possible HF  - c/w Zosyn  - pain control; pt responded well to IV tylenol and ketorolac; moans when in pain  - f/u stool and blood cultures  - rpt LA pending    2) Pleural effusion, hypoxia, HF, NSTEMI  -  tele monitoring  - trend trops; NSTEMI type 2 due to sepsis, possible HF  - c/w supplemental oxygen  - diurese as BP allows  - f/u 2D Echo    3) CVA w/ residual aphasia, dysphagia, paralysis  - NPO  - f/u Swallow eval in am  - pt on risperidone  - pt bedbound w/ sacral ulcer, turn and reposition q2     4) Anemia, thrombocytopenia  - Per son, no known hx, no reported BRBPR on melena  - guaiac neg  - f/u anemia panel  - cont to monitor     5) DM type 2  - FS q6 while NPO w/ SSI  - f/u A1c    6) HTN  - Hold antihypertensives for now BP soft    7) DVT ppx - HSQ    Per son Nelson who is HCP, pt DNR/DNI, MOLST in chart [phone]. Please call if any changes

## 2021-09-30 NOTE — H&P ADULT - NSHPPHYSICALEXAM_GEN_ALL_CORE
PHYSICAL EXAM:    Vital Signs Last 24 Hrs  T(C): 37.2 (30 Sep 2021 18:50), Max: 37.2 (30 Sep 2021 18:50)  T(F): 99 (30 Sep 2021 18:50), Max: 99 (30 Sep 2021 18:50)  HR: 74 (30 Sep 2021 23:44) (74 - 113)  BP: 109/56 (30 Sep 2021 23:44) (100/49 - 109/56)  BP(mean): --  RR: 20 (30 Sep 2021 23:44) (20 - 26)  SpO2: 95% (30 Sep 2021 23:44) (90% - 100%)    GENERAL: Pt lying in bed in distress, moaning  HEENT:  Atraumatic, asymmetrical EOMI, MMM  NECK: Supple, mild JVD  CHEST/LUNG: B/L rales, labored respirations  HEART: Regular rate and rhythm  ABDOMEN: Bowel sounds present; Soft, tender, distended +ve hernia. No guarding or rigidity    EXTREMITIES:  2+ Peripheral Pulses, brisk capillary refill. trace pedal edema  NEUROLOGICAL:  confused, bed bound  MSK: moving ext slightly  SKIN: warm, + stage 2 sacral ulcer

## 2021-09-30 NOTE — H&P ADULT - NSHPLABSRESULTS_GEN_ALL_CORE
T(C): 37.2 (09-30-21 @ 18:50), Max: 37.2 (09-30-21 @ 18:50)  HR: 74 (09-30-21 @ 23:44) (74 - 113)  BP: 109/56 (09-30-21 @ 23:44) (100/49 - 109/56)  RR: 20 (09-30-21 @ 23:44) (20 - 26)  SpO2: 95% (09-30-21 @ 23:44) (90% - 100%)                        8.0    13.33 )-----------( 103      ( 30 Sep 2021 18:37 )             26.6     09-30    137  |  113<H>  |  24<H>  ----------------------------<  244<H>  5.0   |  19<L>  |  1.05    Ca    9.0      30 Sep 2021 18:37    TPro  5.1<L>  /  Alb  1.5<L>  /  TBili  0.9  /  DBili  x   /  AST  38<H>  /  ALT  17  /  AlkPhos  208<H>  09-30    LIVER FUNCTIONS - ( 30 Sep 2021 18:37 )  Alb: 1.5 g/dL / Pro: 5.1 gm/dL / ALK PHOS: 208 U/L / ALT: 17 U/L / AST: 38 U/L / GGT: x             < from: CT Abdomen and Pelvis w/ IV Cont (09.30.21 @ 20:53) >    IMPRESSION:  Small to moderate right and small left pleural effusions with adjacent atelectasis. No pneumonia.  Prominent ascending and proximal descending colon; correlate for mild colitis.    < end of copied text >    EKG - Sinus     acetaminophen   Tablet .. 650 milliGRAM(s) Oral every 6 hours PRN  acetaminophen  Suppository .. 650 milliGRAM(s) Rectal every 6 hours PRN  aluminum hydroxide/magnesium hydroxide/simethicone Suspension 30 milliLiter(s) Oral every 4 hours PRN  collagenase Ointment 1 Application(s) Topical daily  dextrose 40% Gel 15 Gram(s) Oral once  dextrose 5%. 1000 milliLiter(s) IV Continuous <Continuous>  dextrose 5%. 1000 milliLiter(s) IV Continuous <Continuous>  dextrose 50% Injectable 25 Gram(s) IV Push once  dextrose 50% Injectable 12.5 Gram(s) IV Push once  dextrose 50% Injectable 25 Gram(s) IV Push once  famotidine    Tablet 20 milliGRAM(s) Oral daily  glucagon  Injectable 1 milliGRAM(s) IntraMuscular once  heparin   Injectable 5000 Unit(s) SubCutaneous every 12 hours  insulin lispro (ADMELOG) corrective regimen sliding scale   SubCutaneous every 6 hours  nystatin Powder 1 Application(s) Topical two times a day  ondansetron Injectable 4 milliGRAM(s) IV Push every 8 hours PRN  piperacillin/tazobactam IVPB.. 3.375 Gram(s) IV Intermittent every 8 hours  risperiDONE   Tablet 0.5 milliGRAM(s) Oral at bedtime

## 2021-09-30 NOTE — ED PROVIDER NOTE - PROGRESS NOTE DETAILS
CAROL OMALLEY: 89F PMHx of s/p cholecystectomy HTN DM CVA w/ residual aphasia presenting with respiratory distress today. In ED, hypoxic to 88%RA, on 4L NC 92%. CT chest negative for pna. CT AP showing colitis. Lactate 2.9, mild trop 0.74 and BNP 3279. No hx of CHF or CKD. Covid negative, Hb 8.0.  Per grandson at bedside, DNR/DNI.

## 2021-09-30 NOTE — ED ADULT TRIAGE NOTE - CHIEF COMPLAINT QUOTE
BIBA- from home  Past few days has been congested but today around 1pm became quiet. AMS as per family. Usually talkative  EMS POX 56%RA, Left hand 20G/200cc NS

## 2021-10-01 NOTE — PHYSICAL THERAPY INITIAL EVALUATION ADULT - MODALITIES TREATMENT COMMENTS
Stage II pressure injury Sacrum, DTI at L and R greater trochanter, see flowsheet 2 for wound care recommendation

## 2021-10-01 NOTE — SWALLOW BEDSIDE ASSESSMENT ADULT - H & P REVIEW
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 Per Son Nelson, pt has had worsening SOB and congestion for the last few day. Pt also noted to c/o abdominal. Per son pt was constipated and he gave her a laxative and she has had a few episodes of nonbloody diarrhea. Per son pt more lethargic and he noted pt panting thus called EMS. No reported fever, chills, n/v. Pt confused and moaning the ED however responds yes when asked if she has abdominal pain. Per EMS on arrival SPO2 56% on RA thus placed on 100% NRB/yes

## 2021-10-01 NOTE — SWALLOW BEDSIDE ASSESSMENT ADULT - COMMENTS
CT chest w/ IV contrast 9/30/2021IMPRESSION:Small to moderate right and small left pleural effusions with adjacent atelectasis. No pneumonia.  Prominent ascending and proximal descending colon; correlate for mild colitis.

## 2021-10-01 NOTE — PHYSICAL THERAPY INITIAL EVALUATION ADULT - PREDICTED DURATION OF THERAPY (DAYS/WKS), PT EVAL
Dressing change done by RN if dressing compromised on sacrum or every other day, dressing change done if compromised or as needed on DTI of R and L greater trochanter

## 2021-10-01 NOTE — PHYSICAL THERAPY INITIAL EVALUATION ADULT - PERTINENT HX OF CURRENT PROBLEM, REHAB EVAL
Patient admitted with worsening SOB and congestion. On arrival, patient 56% on room air, switched to NRBM and SpO2 improved to 100%. Patient now on nasal cannula. Patient noted to be covid-19 negative.

## 2021-10-01 NOTE — SWALLOW BEDSIDE ASSESSMENT ADULT - SWALLOW EVAL: DIAGNOSIS
pt presented with anxiety/yelling and decreased coordination of swallowing/respiration which may have impacted performance, oropharyngeal phases of swallow marked by intermittent anterior loss, suspect impulsive oral phase spill into the hypopharynx with delay in swallow trigger, reduced hyolaryngeal excursion/elevation and inconsistent multiple swallows. no overt signs of aspiration with consistencies trialed.

## 2021-10-01 NOTE — PHYSICAL THERAPY INITIAL EVALUATION ADULT - ADDITIONAL COMMENTS
Per patient's son Nelson at bedside, patient lives in an apartment with no steps to enter, +elevator. Patient has been bedbound x 2 months (per son it was attributed to patient having CVA but patient did not have medical care at time of CVA), has a dulce lift at home, wheelchair and hospital bed. Patient has home health aide 60 hours x 7 days.

## 2021-10-02 NOTE — CONSULT NOTE ADULT - SUBJECTIVE AND OBJECTIVE BOX
Patient is a 89y old  Female who presents with a chief complaint of Sepsis due to Colitis, Hypoxia, Pleural Effusions (02 Oct 2021 14:14)    HPI:       89 female with HTN, DM type 2, CVA in  w/ residual Aphasia, Dysphagia, Cognitive impairment, now bedbound. Presented  to the ED due to worsening SOB and congestion for  few day. Was constipated, reported to have abdominal pain and son  gave her a laxative and she had a few episodes of nonbloody diarrhea. Son noted her more lethargic and panting thus called EMS.   No reported fever, chills, n/v.   Per EMS on arrival SPO2 56% on RA thus placed on 100% NRB.  Admitted with hypoxic Respiratory failure, elevated troponin, suspected sepsis with lactic acidosis. Imaging studies showed Colitis.    PAST MEDICAL & SURGICAL HISTORY:  Dementia    CVA (cerebrovascular accident)    Hypertension    Diabetes mellitus    S/P hysterectomy    FAMILY HISTORY:  FH: hypertension (Father)    SOCIAL HISTORY:  not able to provide.    Allergies  No Known Allergies    MEDICATIONS  (STANDING):  ALBUTerol    90 MICROgram(s) HFA Inhaler 2 Puff(s) Inhalation every 6 hours  collagenase Ointment 1 Application(s) Topical daily  dextrose 40% Gel 15 Gram(s) Oral once  dextrose 5%. 1000 milliLiter(s) (50 mL/Hr) IV Continuous <Continuous>  dextrose 5%. 1000 milliLiter(s) (100 mL/Hr) IV Continuous <Continuous>  dextrose 50% Injectable 25 Gram(s) IV Push once  dextrose 50% Injectable 12.5 Gram(s) IV Push once  dextrose 50% Injectable 25 Gram(s) IV Push once  famotidine    Tablet 20 milliGRAM(s) Oral daily  glucagon  Injectable 1 milliGRAM(s) IntraMuscular once  heparin   Injectable 5000 Unit(s) SubCutaneous every 12 hours  influenza   Vaccine 0.5 milliLiter(s) IntraMuscular once  insulin lispro (ADMELOG) corrective regimen sliding scale   SubCutaneous three times a day before meals  insulin lispro (ADMELOG) corrective regimen sliding scale   SubCutaneous at bedtime  nystatin Powder 1 Application(s) Topical two times a day  piperacillin/tazobactam IVPB.. 3.375 Gram(s) IV Intermittent every 8 hours  risperiDONE   Tablet 0.5 milliGRAM(s) Oral at bedtime    MEDICATIONS  (PRN):  acetaminophen   Tablet .. 650 milliGRAM(s) Oral every 6 hours PRN Moderate Pain (4 - 6)  aluminum hydroxide/magnesium hydroxide/simethicone Suspension 30 milliLiter(s) Oral every 4 hours PRN Dyspepsia  ondansetron Injectable 4 milliGRAM(s) IV Push every 8 hours PRN Nausea and/or Vomiting    REVIEW OF SYSTEMS:  not able to provide.    Vital Signs Last 24 Hrs  T(C): 37 (02 Oct 2021 16:30), Max: 37 (02 Oct 2021 16:30)  T(F): 98.6 (02 Oct 2021 16:30), Max: 98.6 (02 Oct 2021 16:30)  HR: 77 (02 Oct 2021 17:34) (68 - 90)  BP: 107/64 (02 Oct 2021 16:30) (96/51 - 111/73)  BP(mean): --  RR: 18 (02 Oct 2021 16:30) (18 - 20)  SpO2: 95% (02 Oct 2021 17:34) (91% - 98%)    PHYSICAL EXAM:  GEN:        Lethargic, comfortable.  HEENT:     BIPAP.   RESP:       Decreased air entry.  CVS:         Regular rate and rhythm.   ABD:         Soft, non-tender, non-distended;   SKIN:        Warm and dry.  EXTR:         edema  CNS:         lethargic  PSYCH:      Lethargic    LABS:                        7.3    19.87 )-----------( 90       ( 02 Oct 2021 10:32 )             24.6     10-02    133<L>  |  107  |  31<H>  ----------------------------<  484<HH>  5.4<H>   |  14<L>  |  2.00<H>    Ca    7.8<L>      02 Oct 2021 10:32  Phos  5.6     10-02  Mg     1.6     10-02    TPro  5.1<L>  /  Alb  1.5<L>  /  TBili  0.8  /  DBili  x   /  AST  37  /  ALT  17  /  AlkPhos  203<H>  10-01    Urinalysis Basic - ( 01 Oct 2021 00:50 )    Color: Yellow / Appearance: Clear / S.015 / pH: x  Gluc: x / Ketone: Negative  / Bili: Negative / Urobili: 1 mg/dL   Blood: x / Protein: 30 mg/dL / Nitrite: Negative   Leuk Esterase: Negative / RBC: Negative /HPF / WBC 0-2   Sq Epi: x / Non Sq Epi: Occasional / Bacteria: Few    Culture - Urine (collected 10-01-21 @ 08:25)  Source: Catheterized Catheterized  Final Report (10-02-21 @ 06:43):    No growth    Culture - Blood (collected 10-01-21 @ 00:53)  Source: .Blood Blood  Preliminary Report (10-02-21 @ 01:02):    No growth to date.    Culture - Blood (collected 10-01-21 @ 00:52)  Source: .Blood Blood  Preliminary Report (10-02-21 @ 01:02):    No growth to date.    EKG: sinus tachy.    RADIOLOGY & ADDITIONAL STUDIES:  < from: CT Chest w/ IV Cont (21 @ 20:53) >  EXAM:  CT CHEST IC                          EXAM:  CT ABDOMEN AND PELVIS IC                          PROCEDURE DATE:  2021      INTERPRETATION:  CLINICAL INFORMATION: As of breath, abdominal pain    COMPARISON: None.    CONTRAST/COMPLICATIONS:  IV Contrast: Omnipaque 350 (accession [id number]), IV contrast documented in associated exam (accession [id number])  90 cc administered   10 cc discarded  Oral Contrast: NONE  Complications: None reported at time of study completion    PROCEDURE:  CT of the Chest, Abdomen and Pelvis was performed.  Sagittal and coronal reformats were performed.    FINDINGS:  CHEST:  LUNGS AND LARGE AIRWAYS: Patent central airways. Partial atelectasis of both lower lobes and subsegmental atelectasis of right upper lobe and right middle lobe.  PLEURA: Small to moderate right and small left pleural effusions with adjacent atelectasis.  VESSELS: Number caliber thoracic aorta.  HEART: Heart size is normal. No pericardial effusion. Artery artery calcifications.  MEDIASTINUM AND TITA: No lymphadenopathy.  CHEST WALL AND LOWER NECK: Within normal limits.    ABDOMEN AND PELVIS:  LIVER: Within normal limits.  BILE DUCTS: Normal caliber.  GALLBLADDER: Cholecystectomy.  SPLEEN: Mild splenomegaly.  PANCREAS: Within normal limits.  ADRENALS: Within normal limits.  KIDNEYS/URETERS: Enhance symmetrically. No hydronephrosis. Left renal cyst and subcentimeter hypodensities in both kidneys, too small to characterize.    BLADDER: Within normal limits.  REPRODUCTIVE ORGANS: Several calcified fibroids.    BOWEL: No bowel obstruction. Appendix is unremarkable. Mildly prominent ascending and proximal descending colon.  PERITONEUM: No ascites.  VESSELS: Normal caliber abdominal aorta. Atherosclerosis.  RETROPERITONEUM/LYMPH NODES: No lymphadenopathy.  ABDOMINAL WALL: Diffuse body wall edema.  BONES: Degenerative changes.    IMPRESSION:  Small to moderate right and small left pleural effusions with adjacent atelectasis. No pneumonia.    Prominent ascending and proximal descending colon; correlate for mild colitis.    IRON CHARLES MD; Attending Radiologist  This document has been electronically signed. Sep 30 2021  9:26PM  < from: TTE Echo Complete w/o Contrast w/ Doppler (10.01.21 @ 13:39) >   EXAM:  ECHO TTE WO CON COMP W DOPP      PROCEDURE DATE:  10/01/2021      INTERPRETATION:  TRANSTHORACIC ECHOCARDIOGRAM REPORT    Patient Name:   MARIE MAURISSAINT Patient Location: [facility] Rec #:  [id number]        Accession #:      [id number]  Account #:                        Height:           63.8 in 162.0 cm  YOB: 1923         Weight:           145.5 lb 66.00 kg  Patient Age:    89 years          BSA:              1.70 m²  Patient Gender: F                 BP:          95/44 mmHg    Date of Exam:        10/1/2021 1:39:55 PM  Sonographer:         UMBERTO  Referring Physician: ROMMEL    Procedure:     2D Echo/Doppler/Color Doppler Complete.  Indications:   Shortness of breath - R06.02  Diagnosis:     Shortness of breath - R06.02  Study Details: Technically adequate study.    2D AND M-MODE MEASUREMENTS (normal ranges within parentheses):  Left                 Normal   Aorta/Left            Normal  Ventricle:                    Atrium:  IVSd (2D):    1.17 (0.7-1.1) Aortic Root    2.97  (2.4-3.7)                 cm             (2D):           cm  LVPWd (2D):   0.92  (0.7-1.1) Left Atrium    2.96  (1.9-4.0)                 cm             (2D):           cm  LVIDd (2D):   3.52  (3.4-5.7) LA Vol Index   20.5                 cm             (A4C):        ml/m²  LVIDs (2D):   2.28            LA Vol Index   14.9                 cm             (A2C):        ml/m²  LV FS (2D):   35.1   (>25%)   LA Vol Index   19.4                  %             (BP):  ml/m²  Relative Wall 0.53   (<0.42)  Right  Thickness                     Ventricle:                                TAPSE:          1.28 cm    LV DIASTOLIC FUNCTION:  MV Peak E: 0.99 m/s Decel Time:  153 msec  MV Peak A: 0.37 m/s Septal E/e'  9.4  E/A Ratio: 2.67     Lateral E/e' 7.9  Septal e'  0.1 m/s  Lateral e' 0.1 m/s    SPECTRAL DOPPLER ANALYSIS (where applicable):  Mitral Valve:  MV P1/2 Time: 44.46 msec  MV Area, PHT: 4.95 cm²    Aortic Valve: AoV Max Brock: 1.11 m/s AoV Peak P.9 mmHgAoV Mean P.6 mmHg    LVOT Vmax: 0.81 m/s LVOT VTI: 0.133 m LVOT Diameter: 1.78 cm    AoV Area, Vmax: 1.82 cm² AoV Area, VTI: 1.87 cm² AoV Area, Vmn: 1.48 cm²    Tricuspid Valve and PA/RV Systolic Pressure: TR Max Velocity: 3.28 m/s RA Pressure: 7mmHg RVSP/PASP: 49.9 mmHg    PHYSICIAN INTERPRETATION:  Left Ventricle: The left ventricular internal cavity size is normal. Increased relative wall thickness with normal mass index consistent with left ventricular concentric remodeling.  Global LVsystolic function was normal. Left ventricular ejection fraction, by visual estimation, is 65 to 70%. The left ventricular diastolic function could not be assessed in this study.  Right Ventricle: Normal right ventricular size and function.  Left Atrium: Normal left atrial size.  Right Atrium: Normal right atrial size.  Pericardium: There is no evidence of pericardial effusion. There is a moderate pleural effusion in the left lateral region.  Mitral Valve: Mild thickening and calcification of theanterior and posterior mitral valve leaflets. Mitral leaflet mobility is normal. Trace mitral valve regurgitation is seen.  Tricuspid Valve: The tricuspid valve is degenerative in appearance and not well visualized. Mild tricuspid regurgitation is visualized. Estimated pulmonary artery systolic pressure is 49.9 mmHg assuming a right atrial pressure of 7 mmHg, which is consistent with mild pulmonary hypertension.  Aortic Valve: The aortic valve was not well visualized. Sclerotic aortic valve with decreased opening. Peak transaortic gradient equals 4.9 mmHg, mean transaortic gradient equals 2.6 mmHg, the calculated aortic valve area equals 1.87 cm² by the continuity equation consistent with mild aortic stenosis. No evidence of aortic valve regurgitation is seen.  Pulmonic Valve: The pulmonic valve was not well visualized. Mild pulmonic valve regurgitation.  Aorta: Aortic root measured at Sinus of Valsalva is normal. There is mild aortic root calcification.  Venous: IVC not visualized.    Summary:   1. Left ventricular ejection fraction, by visual estimation, is 65 to 70%.   2. Technically adequate study.   3. Normal global left ventricular systolic function.   4. Normal left ventricular internal cavity size.   5. The left ventricular diastolic function could not be assessed in this study.   6. Normal right ventricular size and function.   7. Normal left atrial size.   8. Normal right atrial size.   9. Moderate pleural effusion in the left lateral region.  10. There is no evidence of pericardial effusion.  11. Mild thickening and calcification of the anterior and posterior mitral valve leaflets.  12. Trace mitral valve regurgitation.  13. Degenerative tricuspid valve.  14. Mild tricuspid regurgitation.  15. Sclerotic aortic valve with decreased opening.  16. Mild pulmonic valve regurgitation.  17. Estimated pulmonary artery systolic pressure is 49.9 mmHg assuming a right atrial pressure of 7 mmHg, which is consistent with mild pulmonary hypertension.  18. Increased relativewall thickness with normal mass index consistent with left ventricular concentric remodeling.  19. Peak transaortic gradient equals 4.9 mmHg, mean transaortic gradient equals 2.6 mmHg, the calculated aortic valve area equals 1.87 cm² by the continuity equation consistent with mild aortic stenosis.  20. There is mild aortic root calcification.    Zeeshan Gonzalez MD FACC, FASE, FACP  Electronically signed on 10/1/2021 at 8:32:33 PM    ZEESHAN GONZALEZ   This document has been electronically signed. Oct  1 2021  1:39PM    ASSESSMENT AND PLAN:  ·	Acute hypoxic Respiratory failure.  ·	Acute on chronic diastolic CHF.  ·	Bilateral pleural effusion.  ·	Elevated troponin.  ·	Leukocytosis,  ·	Anemia.  ·	Renal Insuffiencey.  ·	Acute metabolic Encephalopathy.  ·	CVA with aphasia,    SPO2 100% on BIPAP with 40% FiO2.  Continue antibiotics.  Bronchodilators.  Follow renal function.

## 2021-10-02 NOTE — CONSULT NOTE ADULT - SUBJECTIVE AND OBJECTIVE BOX
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 S/p few episodes of nonbloody diarrhea. Pt more lethargic. No fever, chills, n/v. Pt is unable to provide hx. Hx from chart. Asked to eval for MAUREEN. S/p Ketorolac, Lasix.    ROS:  as above    Allergies:  No Known Allergies    PAST MEDICAL & SURGICAL HISTORY:  Dementia  CVA (cerebrovascular accident)  Hypertension  Diabetes mellitus  S/P hysterectomy    SOCIAL HISTORY:  negative    FAMILY HISTORY:  FH: hypertension (Father)    MEDICATIONS  (STANDING):  ALBUTerol    90 MICROgram(s) HFA Inhaler 2 Puff(s) Inhalation every 6 hours  collagenase Ointment 1 Application(s) Topical daily  dextrose 40% Gel 15 Gram(s) Oral once  dextrose 5%. 1000 milliLiter(s) (50 mL/Hr) IV Continuous <Continuous>  dextrose 5%. 1000 milliLiter(s) (100 mL/Hr) IV Continuous <Continuous>  dextrose 50% Injectable 25 Gram(s) IV Push once  dextrose 50% Injectable 12.5 Gram(s) IV Push once  dextrose 50% Injectable 25 Gram(s) IV Push once  famotidine    Tablet 20 milliGRAM(s) Oral daily  glucagon  Injectable 1 milliGRAM(s) IntraMuscular once  heparin   Injectable 5000 Unit(s) SubCutaneous every 12 hours  influenza   Vaccine 0.5 milliLiter(s) IntraMuscular once  insulin lispro (ADMELOG) corrective regimen sliding scale   SubCutaneous three times a day before meals  insulin lispro (ADMELOG) corrective regimen sliding scale   SubCutaneous at bedtime  nystatin Powder 1 Application(s) Topical two times a day  piperacillin/tazobactam IVPB.. 3.375 Gram(s) IV Intermittent every 8 hours  risperiDONE   Tablet 0.5 milliGRAM(s) Oral at bedtime    Home Medications:  AMOXICILLIN 500 MG TABLET:  (30 Sep 2021 23:30)  FAMOTIDINE 40 MG TABLET:  (30 Sep 2021 23:30)  GLIPIZIDE ER 2.5 MG TABLET: take 1 tablet by mouth once daily (30 Sep 2021 23:30)  HYDROCHLOROTHIAZIDE 50 MG TAB: take 1 tablet by mouth once daily (30 Sep 2021 23:30)  JANUVIA 50 MG TABLET:  (30 Sep 2021 23:30)  LANTUS SOLOSTAR 100 UNIT/ML: inject 30 units subcutaneously at bedtime (30 Sep 2021 23:30)  MELOXICAM 7.5 MG TABLET:  (30 Sep 2021 23:30)  PROMETHAZINE 6.25 MG/5 ML SOLN: take 5 milliliters ( 1 TEASPOONFUL ) by mouth three times a day for 10 days (30 Sep 2021 23:30)  RISPERIDONE 0.5 MG TABLET:  (30 Sep 2021 23:30)  SANTYL OINTMENT: apply thin layer to affected area once daily (30 Sep 2021 23:30)  VALSARTAN 80 MG TABLET:  (30 Sep 2021 23:30)    Vital Signs Last 24 Hrs  T(C): 36.3 (10-02-21 @ 10:03), Max: 36.7 (10-01-21 @ 16:20)  T(F): 97.4 (10-02-21 @ 10:03), Max: 98 (10-01-21 @ 16:20)  HR: 90 (10-02-21 @ 10:03) (68 - 90)  BP: 111/73 (10-02-21 @ 10:03) (96/51 - 111/73)  RR: 20 (10-02-21 @ 10:03) (18 - 20)  SpO2: 96% (10-02-21 @ 10:03) (91% - 98%)    LABS:                        7.3    19.87 )-----------( 90       ( 02 Oct 2021 10:32 )             24.6     10    133<L>  |  107  |  31<H>  ----------------------------<  484<HH>  5.4<H>   |  14<L>  |  2.00<H>    Ca    7.8<L>      02 Oct 2021 10:32  Phos  5.6     10-02  Mg     1.6     10    TPro  5.1<L>  /  Alb  1.5<L>  /  TBili  0.8  /  DBili  x   /  AST  37  /  ALT  17  /  AlkPhos  203<H>  10-01    Urinalysis Basic - ( 01 Oct 2021 00:50 )    Color: Yellow / Appearance: Clear / S.015 / pH: x  Gluc: x / Ketone: Negative  / Bili: Negative / Urobili: 1 mg/dL   Blood: x / Protein: 30 mg/dL / Nitrite: Negative   Leuk Esterase: Negative / RBC: Negative /HPF / WBC 0-2   Sq Epi: x / Non Sq Epi: Occasional / Bacteria: Few    LIVER FUNCTIONS - ( 01 Oct 2021 08:50 )  Alb: 1.5 g/dL / Pro: 5.1 gm/dL / ALK PHOS: 203 U/L / ALT: 17 U/L / AST: 37 U/L / GGT: x           Culture - Urine (collected 01 Oct 2021 08:25)  Source: Catheterized Catheterized  Final Report (02 Oct 2021 06:43):    No growth    Culture - Blood (collected 01 Oct 2021 00:53)  Source: .Blood Blood  Preliminary Report (02 Oct 2021 01:02):    No growth to date.    Culture - Blood (collected 01 Oct 2021 00:52)  Source: .Blood Blood  Preliminary Report (02 Oct 2021 01:02):    No growth to date.    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 S/p few episodes of nonbloody diarrhea. Pt lethargic. S/p RRT earlier today. No fever, chills, n/v. On BiPAP. Pt is unable to provide hx. Hx from chart. Asked to eval for MAUREEN. S/p Ketorolac, Lasix  and 10/01.     ROS:  as above    Allergies:  No Known Allergies    PAST MEDICAL & SURGICAL HISTORY:  Dementia  CVA (cerebrovascular accident)  Hypertension  Diabetes mellitus  S/P hysterectomy    SOCIAL HISTORY:  negative    FAMILY HISTORY:  FH: hypertension (Father)    MEDICATIONS  (STANDING):  ALBUTerol    90 MICROgram(s) HFA Inhaler 2 Puff(s) Inhalation every 6 hours  collagenase Ointment 1 Application(s) Topical daily  dextrose 40% Gel 15 Gram(s) Oral once  dextrose 5%. 1000 milliLiter(s) (50 mL/Hr) IV Continuous <Continuous>  dextrose 5%. 1000 milliLiter(s) (100 mL/Hr) IV Continuous <Continuous>  dextrose 50% Injectable 25 Gram(s) IV Push once  dextrose 50% Injectable 12.5 Gram(s) IV Push once  dextrose 50% Injectable 25 Gram(s) IV Push once  famotidine    Tablet 20 milliGRAM(s) Oral daily  glucagon  Injectable 1 milliGRAM(s) IntraMuscular once  heparin   Injectable 5000 Unit(s) SubCutaneous every 12 hours  influenza   Vaccine 0.5 milliLiter(s) IntraMuscular once  insulin lispro (ADMELOG) corrective regimen sliding scale   SubCutaneous three times a day before meals  insulin lispro (ADMELOG) corrective regimen sliding scale   SubCutaneous at bedtime  nystatin Powder 1 Application(s) Topical two times a day  piperacillin/tazobactam IVPB.. 3.375 Gram(s) IV Intermittent every 8 hours  risperiDONE   Tablet 0.5 milliGRAM(s) Oral at bedtime    Home Medications:  AMOXICILLIN 500 MG TABLET:  (30 Sep 2021 23:30)  FAMOTIDINE 40 MG TABLET:  (30 Sep 2021 23:30)  GLIPIZIDE ER 2.5 MG TABLET: take 1 tablet by mouth once daily (30 Sep 2021 23:30)  HYDROCHLOROTHIAZIDE 50 MG TAB: take 1 tablet by mouth once daily (30 Sep 2021 23:30)  JANUVIA 50 MG TABLET:  (30 Sep 2021 23:30)  LANTUS SOLOSTAR 100 UNIT/ML: inject 30 units subcutaneously at bedtime (30 Sep 2021 23:30)  MELOXICAM 7.5 MG TABLET:  (30 Sep 2021 23:30)  PROMETHAZINE 6.25 MG/5 ML SOLN: take 5 milliliters ( 1 TEASPOONFUL ) by mouth three times a day for 10 days (30 Sep 2021 23:30)  RISPERIDONE 0.5 MG TABLET:  (30 Sep 2021 23:30)  SANTYL OINTMENT: apply thin layer to affected area once daily (30 Sep 2021 23:30)  VALSARTAN 80 MG TABLET:  (30 Sep 2021 23:30)    Vital Signs Last 24 Hrs  T(C): 36.3 (10-02-21 @ 10:03), Max: 36.7 (10-01-21 @ 16:20)  T(F): 97.4 (10-02-21 @ 10:03), Max: 98 (10-01-21 @ 16:20)  HR: 90 (10-02-21 @ 10:03) (68 - 90)  BP: 111/73 (10-02-21 @ 10:03) (96/51 - 111/73)  RR: 20 (10-02-21 @ 10:03) (18 - 20)  SpO2: 96% (10-02-21 @ 10:03) (91% - 98%)    s1s2  decr BS b/l  soft, ND  sm edema    LABS:                        7.3    19.87 )-----------( 90       ( 02 Oct 2021 10:32 )             24.6     10-02    133<L>  |  107  |  31<H>  ----------------------------<  484<HH>  5.4<H>   |  14<L>  |  2.00<H>    Ca    7.8<L>      02 Oct 2021 10:32  Phos  5.6     10-02  Mg     1.6     10-02    TPro  5.1<L>  /  Alb  1.5<L>  /  TBili  0.8  /  DBili  x   /  AST  37  /  ALT  17  /  AlkPhos  203<H>  10-01    Urinalysis Basic - ( 01 Oct 2021 00:50 )    Color: Yellow / Appearance: Clear / S.015 / pH: x  Gluc: x / Ketone: Negative  / Bili: Negative / Urobili: 1 mg/dL   Blood: x / Protein: 30 mg/dL / Nitrite: Negative   Leuk Esterase: Negative / RBC: Negative /HPF / WBC 0-2   Sq Epi: x / Non Sq Epi: Occasional / Bacteria: Few    LIVER FUNCTIONS - ( 01 Oct 2021 08:50 )  Alb: 1.5 g/dL / Pro: 5.1 gm/dL / ALK PHOS: 203 U/L / ALT: 17 U/L / AST: 37 U/L / GGT: x           Culture - Urine (collected 01 Oct 2021 08:25)  Source: Catheterized Catheterized  Final Report (02 Oct 2021 06:43):    No growth    Culture - Blood (collected 01 Oct 2021 00:53)  Source: .Blood Blood  Preliminary Report (02 Oct 2021 01:02):    No growth to date.    Culture - Blood (collected 01 Oct 2021 00:52)  Source: .Blood Blood  Preliminary Report (02 Oct 2021 01:02):    No growth to date.    A/P:    S/p CT w/IV dye 21  MAUREEN, met acidosis, hyperkalemia multifactorial: due to contrast, relative hypotension, uncontrolled DM exacerbated by Lasix, NSAID's  Pls correct Glu  No NSAID's  Hold Lasix  Avoid nephrotoxins  Goal SBP > 90  Bruce, I/Os  Low K, DM diet  Lokelma PRN  Na Bicarb PO  Overall options are limited and prognosis is poor  DNR/I  Palliative care most appropriate    [phone]

## 2021-10-02 NOTE — RAPID RESPONSE TEAM SUMMARY - NSSITUATIONBACKGROUNDRRT_GEN_ALL_CORE
89 y/o [place] citizen Creole speaking female w/ PMHx of HTN, DM II, CVA in June w/ residual aphasia, dysphagia, dementia, admitted for hypoxic respiratory failure, CHF exacerbation, elevated trop and severe sepsis POA due to acute colitis w/ lactic acidosis, now with RRT called by telemetry technician for noted bradycardia, with brief drop of HR to 29 bpm with baseline Afib rhythm.      Patient alert, confused at baseline 2/2 dementia dx, in moderate respiratory distress upon arrival.

## 2021-10-02 NOTE — RAPID RESPONSE TEAM SUMMARY - NSOTHERINTERVENTIONSRRT_GEN_ALL_CORE
Chest PT, suctioning prn  continue Zosyn as ordered   daily weights  arciniega to BSD  strict I&O  12 lead EKG  cardiac enzymes, cbc, cmp   follow labs     d/w Hospitalist Dr. Rogers ([id number]) aware and in agreement with POC

## 2021-10-02 NOTE — PROVIDER CONTACT NOTE (CRITICAL VALUE NOTIFICATION) - ASSESSMENT
fingerstick 248, will give sliding scale insulin coverage fingerstick 227, will give sliding scale insulin coverage

## 2021-10-02 NOTE — RAPID RESPONSE TEAM SUMMARY - NSADDTLFINDINGSRRT_GEN_ALL_CORE
Lungs with b/l rales noted, + accessory muscle use, no nasal flaring   CV: irregular S1, S2 no JVD    Vital Signs Last 24 Hrs  T(F): 97.4   HR: 90   BP: 111/73   RR: 30   SpO2: 91%   Lungs with b/l rales noted, + accessory muscle use, no nasal flaring   CV: irregular S1, S2 no JVD    Vital Signs:  T(F): 97.4   HR: 90   BP: 111/73   RR: 30   SpO2: 91%

## 2021-10-02 NOTE — CHART NOTE - NSCHARTNOTEFT_GEN_A_CORE
EVENT: Received telephone call from RN that pt's repeated serum potassium is 6.0 from 5.4 this AM    Brief HPI:  89 y/o [place] Creole speaking female w/ PMHx of HTN, DM type 2, CVA in June w/ residual aphasia, dysphagia, cognitive impairment, now bedbound presents to the ED due to SOB and abdominal pain. Pt being admitted for hypoxia due to pleural effusions, possible acute CHF, NSTEMI, Sepsis due to acute colitis.    Subjective: n/a        OBJECTIVE:  Vital Signs Last 24 Hrs  T(C): 37 (02 Oct 2021 16:30), Max: 37 (02 Oct 2021 16:30)  T(F): 98.6 (02 Oct 2021 16:30), Max: 98.6 (02 Oct 2021 16:30)  HR: 78 (02 Oct 2021 21:17) (68 - 90)  BP: 107/64 (02 Oct 2021 16:30) (96/51 - 111/73)  BP(mean): --  RR: 18 (02 Oct 2021 16:30) (18 - 20)  SpO2: 99% (02 Oct 2021 21:17) (91% - 99%)    FOCUSED PHYSICAL EXAM:  Neuro: awake,In NAD  Cardiovascular: Pulses +2 B/L in lower and upper extremities, HR regular, BP stable, No edema.  Respiratory: Respirations regular, unlabored, breath sounds clear B/L.   GI: Abdomen soft, non-tender, positive bowel sounds.  : no bladder distention noted. No complaints at this time.  Skin: Dry, intact, no bruising, no diaphoresis.    LABS:                        7.3    19.87 )-----------( 90       ( 02 Oct 2021 10:32 )             24.6   CARDIAC MARKERS ( 02 Oct 2021 10:34 )  .182 ng/mL / x     / x     / x     / x      CARDIAC MARKERS ( 01 Oct 2021 08:50 )  .101 ng/mL / x     / x     / x     / x      CARDIAC MARKERS ( 01 Oct 2021 01:01 )  .153 ng/mL / x     / x     / x     / x        10-02    138  |  114<H>  |  37<H>  ----------------------------<  309<H>  6.0<H>   |  16<L>  |  2.27<H>    Ca    9.1      02 Oct 2021 19:53  Phos  5.6     10-02  Mg     1.9     10-02    TPro  5.1<L>  /  Alb  1.5<L>  /  TBili  0.8  /  DBili  x   /  AST  37  /  ALT  17  /  AlkPhos  203<H>  10-01      EKG:   IMAGING:      ASSESSMENT/PROBLEM: Hyperkalemia      PLAN:   1. Kayexalate 30mg, PO x 1 dose ordered  2. Monitor response to treatment  3. F/u with repeated serum potassium in AM  4. Cont present care/treatment  5. Supportive care

## 2021-10-03 NOTE — CONSULT NOTE ADULT - SUBJECTIVE AND OBJECTIVE BOX
MAURISSAINT, MARIE  MRN-[id number]        Patient is a 89y old  Female who presents with a chief complaint of Sepsis due to Colitis, Hypoxia, Pleural Effusions (02 Oct 2021 22:53)      HPI:       89 y/o [place] Creole speaking female w/ PMHx of HTN, DM type 2, CVA in June w/ residual aphasia, dysphagia, cognitive impairment, now bedbound presents to the ED due to SOB. Per Son Nelson, pt has had worsening SOB and congestion for the last few day. Pt also noted to c/o abdominal. Per son pt was constipated and he gave her a laxative and she has had a few episodes of nonbloody diarrhea. Per son pt more lethargic and he noted pt panting thus called EMS. No reported fever, chills, n/v. Pt confused and moaning the ED however responds yes when asked if she has abdominal pain. Per EMS on arrival SPO2 56% on RA thus placed on 100% NRB    In ED initial vitals /59, , pt afebrile. Labs sig for WBC 13.3, H/H 8/26.6, pt 103, LA 2.9, Trop 0.074, BNP 3279. Pt given Zosyn, 2.25 L IV bolus.  (30 Sep 2021 22:37)      ID consulted for workup and antibiotic management     PAST MEDICAL & SURGICAL HISTORY:  Dementia    CVA (cerebrovascular accident)    Hypertension    Diabetes mellitus    S/P hysterectomy        Allergies  No Known Allergies        ANTIMICROBIALS:  meropenem  IVPB        MEDICATIONS  (STANDING):    meropenem  IVPB   100 mL/Hr IV Intermittent (10-03-21 @ 14:23)    piperacillin/tazobactam IVPB..   25 mL/Hr IV Intermittent (10-03-21 @ 05:15)   25 mL/Hr IV Intermittent (10-02-21 @ 21:05)   25 mL/Hr IV Intermittent (10-02-21 @ 13:46)   25 mL/Hr IV Intermittent (10-02-21 @ 05:26)   25 mL/Hr IV Intermittent (10-01-21 @ 21:10)   25 mL/Hr IV Intermittent (10-01-21 @ 13:03)   25 mL/Hr IV Intermittent (10-01-21 @ 06:04)    piperacillin/tazobactam IVPB...   200 mL/Hr IV Intermittent (09-30-21 @ 22:12)        OTHER MEDS: MEDICATIONS  (STANDING):  acetaminophen   Tablet .. 650 every 6 hours PRN  ALBUTerol    90 MICROgram(s) HFA Inhaler 2 every 6 hours  dextrose 40% Gel 15 once  dextrose 50% Injectable 25 once  dextrose 50% Injectable 12.5 once  dextrose 50% Injectable 25 once  glucagon  Injectable 1 once  heparin   Injectable 5000 every 12 hours  influenza   Vaccine 0.5 once  insulin lispro (ADMELOG) corrective regimen sliding scale  three times a day before meals  insulin lispro (ADMELOG) corrective regimen sliding scale  at bedtime  ondansetron Injectable 4 every 8 hours PRN  risperiDONE   Tablet 0.5 at bedtime      SOCIAL HISTORY:       FAMILY HISTORY:  FH: hypertension (Father)        REVIEW OF SYSTEMS  [  ] ROS unobtainable because:    [  ] All other systems negative except as noted below:	    Constitutional:  [ ] fever [ ] chills  [ ] weight loss  [ ] weakness  Skin:  [ ] rash [ ] phlebitis	  Eyes: [ ] icterus [ ] pain  [ ] discharge	  ENMT: [ ] sore throat  [ ] thrush [ ] ulcers [ ] exudates  Respiratory: [ ] dyspnea [ ] hemoptysis [ ] cough [ ] sputum	  Cardiovascular:  [ ] chest pain [ ] palpitations [ ] edema	  Gastrointestinal:  [ ] nausea [ ] vomiting [ ] diarrhea [ ] constipation [ ] pain	  Genitourinary:  [ ] dysuria [ ] frequency [ ] hematuria [ ] discharge [ ] flank pain  [ ] incontinence  Musculoskeletal:  [ ] myalgias [ ] arthralgias [ ] arthritis  [ ] back pain  Neurological:  [ ] headache [ ] seizures  [ ] confusion/altered mental status  Psychiatric:  [ ] anxiety [ ] depression	  Hematology/Lymphatics:  [ ] lymphadenopathy  Endocrine:  [ ] adrenal [ ] thyroid  Allergic/Immunologic:	 [ ] transplant [ ] seasonal    Vital Signs Last 24 Hrs  T(F): 98.5 (10-03-21 @ 11:01), Max: 99 (09-30-21 @ 18:50)    Vital Signs Last 24 Hrs  HR: 82 (10-03-21 @ 12:22) (68 - 85)  BP: 94/50 (10-03-21 @ 11:01) (94/50 - 123/65)  RR: 20 (10-03-21 @ 11:01)  SpO2: 95% (10-03-21 @ 12:22) (90% - 100%)  Wt(kg): --    PHYSICAL EXAM:  Constitutional: ill appearing female on bipap in respiratory distress   HEAD/EYES: anicteric, no conjunctival injection  ENT:  supple  Cardiovascular:   normal S1, S2, no murmur, no edema  Respiratory:  rhonchorus BS bilaterally, no wheezes  GI:  soft, +tenderness, normal bowel sounds  Musculoskeletal:  no synovitis, not moving extremities   Neurologic: patient not following commands   Skin:  no rash, no erythema, no phlebitis  Heme/Onc: no cervical lymphadenopathy   Psychiatric:  awake, uncomfortable appearing           WBC Count: 32.12 K/uL (10-03 @ 08:55)  WBC Count: 19.87 K/uL (10-02 @ 10:32)  WBC Count: 18.11 K/uL (10-01 @ 08:50)  WBC Count: 13.33 K/uL (09-30 @ 18:37)                            7.3    32.12 )-----------( 89       ( 03 Oct 2021 08:55 )             25.1       10-03    139  |  115<H>  |  41<H>  ----------------------------<  223<H>  6.4<HH>   |  14<L>  |  2.61<H>    Ca    9.4      03 Oct 2021 08:55  Phos  7.1     10-03  Mg     2.2     10-03    TPro  5.4<L>  /  Alb  1.6<L>  /  TBili  0.7  /  DBili  x   /  AST  48<H>  /  ALT  19  /  AlkPhos  204<H>  10-03      Creatinine Trend: 2.61<--, 2.27<--, 2.00<--, 0.99<--, 1.05<--        MICROBIOLOGY:    Culture - Urine (collected 10-01-21 @ 08:25)  Source: Catheterized Catheterized  Final Report (10-02-21 @ 06:43):    No growth    Culture - Blood (collected 10-01-21 @ 00:53)  Source: .Blood Blood  Preliminary Report (10-02-21 @ 01:02):    No growth to date.    Culture - Blood (collected 10-01-21 @ 00:52)  Source: .Blood Blood  Preliminary Report (10-02-21 @ 01:02):    No growth to date.      Rapid RVP Result: NotDetec (09-30 @ 18:39)    Ferritin, Serum: 175 (10-02)    RADIOLOGY:  < from: Xray Chest 1 View- PORTABLE-Urgent (Xray Chest 1 View- PORTABLE-Urgent .) (10.02.21 @ 13:39) >  Findings/  Impression: Severe cardiomegaly. Acute bibasilar patchy opacification representing pulmonary edema versus pneumonia. Trace bilateral pleural effusions.    < from: CT Abdomen and Pelvis w/ IV Cont (09.30.21 @ 20:53) >  IMPRESSION:  Small to moderate right and small left pleural effusions with adjacent atelectasis. No pneumonia.    Prominent ascending and proximal descending colon; correlate for mild colitis.    < end of copied text >

## 2021-10-03 NOTE — CONSULT NOTE ADULT - ASSESSMENT
89 y/o [place] Creole speaking female w/ PMHx of HTN, DM II, CVA in June w/ residual aphasia, dysphagia, dementia presented w/ dyspnea and nonbloody diarrhea. Pt was admitted for hypoxic respiratory failure, elevated troponin,.  She has a rising WBC, is now on bipap in acute respiratory failure, BP on the soft side, rising creatinine with elevated potassium at generous levels and poor IV access  CXR (I personally reviewed) from 10/2 with pulm edema vs pneumonia and cardiomegaly   CT with possible colitis and she apparently had diarrhe as well as currently has tender abdomen   blood cultures thus far negative  RVP negative for covid or other viral processes   Family has requested DNR   Would add broad spectrum antibiotics, treat the hyperkalemia, continue bipap     Multifocal pneumonia   ARF with hypoxia  hyperkalemia  MAUREEN  type 2 DM    Plan:  ·	start meropenem 1g q24hrs  ·	if spikes fever, repeat 2 sets of blood cultures   ·	treat hyperkalemia per protocol/medicine  ·	trend creatinine and potassium  ·	good but not tight glycemic control, a1c is 6.5 thus well controlled   ·	GOC with family   ·	continue bipap     Discussed with Dr. Karla Knox DO  Infectious Disease Attending  Pager [phone]  After 5pm/weekends please call [phone] for all inquiries and new consults

## 2021-10-03 NOTE — PROGRESS NOTE ADULT - SUBJECTIVE AND OBJECTIVE BOX
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 Pt was admitted for hypoxic respiratory failure, elevated trop and severe sepsis POA due to acute colitis w/ lactic acidosis. RRT was called this morning for respiratory distress.      MEDICATIONS  (STANDING):  ALBUTerol    90 MICROgram(s) HFA Inhaler 2 Puff(s) Inhalation every 6 hours  allopurinol 100 milliGRAM(s) Oral daily  collagenase Ointment 1 Application(s) Topical daily  dextrose 40% Gel 15 Gram(s) Oral once  dextrose 5%. 1000 milliLiter(s) (50 mL/Hr) IV Continuous <Continuous>  dextrose 5%. 1000 milliLiter(s) (100 mL/Hr) IV Continuous <Continuous>  dextrose 50% Injectable 25 Gram(s) IV Push once  dextrose 50% Injectable 12.5 Gram(s) IV Push once  dextrose 50% Injectable 25 Gram(s) IV Push once  dextrose 50% Injectable 50 milliLiter(s) IV Push once  epoetin yeny-epbx (RETACRIT) Injectable 98685 Unit(s) SubCutaneous every 7 days  glucagon  Injectable 1 milliGRAM(s) IntraMuscular once  heparin   Injectable 5000 Unit(s) SubCutaneous every 12 hours  influenza   Vaccine 0.5 milliLiter(s) IntraMuscular once  insulin lispro (ADMELOG) corrective regimen sliding scale   SubCutaneous three times a day before meals  insulin lispro (ADMELOG) corrective regimen sliding scale   SubCutaneous at bedtime  insulin regular  human recombinant 10 Unit(s) IV Push once  meropenem  IVPB      nystatin Powder 1 Application(s) Topical two times a day  risperiDONE   Tablet 0.5 milliGRAM(s) Oral at bedtime  sodium bicarbonate 650 milliGRAM(s) Oral three times a day  sodium zirconium cyclosilicate 10 Gram(s) Oral three times a day    MEDICATIONS  (PRN):  acetaminophen   Tablet .. 650 milliGRAM(s) Oral every 6 hours PRN Moderate Pain (4 - 6)  ondansetron Injectable 4 milliGRAM(s) IV Push every 8 hours PRN Nausea and/or Vomiting      Allergies    No Known Allergies    Intolerances        Vital Signs Last 24 Hrs  T(C): 36.6 (03 Oct 2021 19:55), Max: 36.9 (03 Oct 2021 11:01)  T(F): 97.9 (03 Oct 2021 19:55), Max: 98.5 (03 Oct 2021 11:01)  HR: 84 (03 Oct 2021 19:55) (76 - 85)  BP: 97/63 (03 Oct 2021 19:55) (94/50 - 123/65)   RR: 18 (03 Oct 2021 19:55) (18 - 20)  SpO2: 97% (03 Oct 2021 19:55) (90% - 100%)    PHYSICAL EXAM:  GENERAL: mild respiratory distress  HEAD:  Atraumatic, Normocephalic  NECK: Supple   NERVOUS SYSTEM:  lethargic  CHEST/LUNG: Dec BS bilaterally w/ increased work of breathing  HEART: Regular rate and rhythm; No murmurs, rubs, or gallops  ABDOMEN: Soft, Nontender, Nondistended; Bowel sounds present  EXTREMITIES:  b/l LE edema    LABS:                        7.3    32.12 )-----------( 89       ( 03 Oct 2021 08:55 )             25.1     10-03    x   |  x   |  x   ----------------------------<  x   5.4<H>   |  x   |  x     Ca    9.4      03 Oct 2021 08:55  Phos  7.1     10-03  Mg     2.2     10-03    TPro  5.4<L>  /  Alb  1.6<L>  /  TBili  0.7  /  DBili  x   /  AST  48<H>  /  ALT  19  /  AlkPhos  204<H>  10-03        CAPILLARY BLOOD GLUCOSE      POCT Blood Glucose.: 186 mg/dL (03 Oct 2021 21:03)  POCT Blood Glucose.: 231 mg/dL (03 Oct 2021 16:34)  POCT Blood Glucose.: 209 mg/dL (03 Oct 2021 11:51)  POCT Blood Glucose.: 226 mg/dL (03 Oct 2021 08:19)      Culture - Urine (collected 01 Oct 2021 08:25)  Source: Catheterized Catheterized  Final Report (02 Oct 2021 06:43):    No growth    Culture - Blood (collected 01 Oct 2021 00:53)  Source: .Blood Blood  Preliminary Report (02 Oct 2021 01:02):    No growth to date.    Culture - Blood (collected 01 Oct 2021 00:52)  Source: .Blood Blood  Preliminary Report (02 Oct 2021 01:02):    No growth to date.      RADIOLOGY & ADDITIONAL TESTS:  
Lethargic, on BiPAP    Vital Signs Last 24 Hrs  T(C): 36.6 (10-03-21 @ 19:55), Max: 36.9 (10-03-21 @ 11:01)  T(F): 97.9 (10-03-21 @ 19:55), Max: 98.5 (10-03-21 @ 11:01)  HR: 84 (10-03-21 @ 19:55) (76 - 85)  BP: 97/63 (10-03-21 @ 19:55) (94/50 - 123/65)  RR: 18 (10-03-21 @ 19:55) (18 - 20)  SpO2: 97% (10-03-21 @ 19:55) (90% - 100%)    s1s2  decr BS b/l  soft, ND  sm edema                        7.3    32.12 )-----------( 89       ( 03 Oct 2021 08:55 )             25.1     03 Oct 2021 19:03    x      |  x      |  x      ----------------------------<  x      5.4     |  x      |  x        Ca    9.4        03 Oct 2021 08:55  Phos  7.1       03 Oct 2021 08:55  Mg     2.2       03 Oct 2021 08:55    TPro  5.4    /  Alb  1.6    /  TBili  0.7    /  DBili  x      /  AST  48     /  ALT  19     /  AlkPhos  204    03 Oct 2021 08:55    LIVER FUNCTIONS - ( 03 Oct 2021 08:55 )  Alb: 1.6 g/dL / Pro: 5.4 gm/dL / ALK PHOS: 204 U/L / ALT: 19 U/L / AST: 48 U/L / GGT: x           CARDIAC MARKERS ( 02 Oct 2021 10:34 )  .182 ng/mL / x     / x     / x     / x        Culture - Urine (collected 01 Oct 2021 08:25)  Source: Catheterized Catheterized  Final Report (02 Oct 2021 06:43):    No growth    Culture - Blood (collected 01 Oct 2021 00:53)  Source: .Blood Blood  Preliminary Report (02 Oct 2021 01:02):    No growth to date.    Culture - Blood (collected 01 Oct 2021 00:52)  Source: .Blood Blood  Preliminary Report (02 Oct 2021 01:02):    No growth to date.    acetaminophen   Tablet .. 650 milliGRAM(s) Oral every 6 hours PRN  ALBUTerol    90 MICROgram(s) HFA Inhaler 2 Puff(s) Inhalation every 6 hours  collagenase Ointment 1 Application(s) Topical daily  dextrose 40% Gel 15 Gram(s) Oral once  dextrose 5%. 1000 milliLiter(s) IV Continuous <Continuous>  dextrose 5%. 1000 milliLiter(s) IV Continuous <Continuous>  dextrose 50% Injectable 25 Gram(s) IV Push once  dextrose 50% Injectable 12.5 Gram(s) IV Push once  dextrose 50% Injectable 25 Gram(s) IV Push once  dextrose 50% Injectable 50 milliLiter(s) IV Push once  glucagon  Injectable 1 milliGRAM(s) IntraMuscular once  heparin   Injectable 5000 Unit(s) SubCutaneous every 12 hours  influenza   Vaccine 0.5 milliLiter(s) IntraMuscular once  insulin lispro (ADMELOG) corrective regimen sliding scale   SubCutaneous three times a day before meals  insulin lispro (ADMELOG) corrective regimen sliding scale   SubCutaneous at bedtime  insulin regular  human recombinant 10 Unit(s) IV Push once  meropenem  IVPB      nystatin Powder 1 Application(s) Topical two times a day  ondansetron Injectable 4 milliGRAM(s) IV Push every 8 hours PRN  risperiDONE   Tablet 0.5 milliGRAM(s) Oral at bedtime  sodium bicarbonate 650 milliGRAM(s) Oral three times a day  sodium zirconium cyclosilicate 10 Gram(s) Oral three times a day    A/P:    Resp distress, on BiPAP  S/p CT w/IV dye 9/30/21  MAUREEN, met acidosis, hyperkalemia multifactorial: due to contrast, relative hypotension, uncontrolled DM, exacerbated by Lasix, NSAID's (got 9/30 and 10/1)  No NSAID's  Hold Lasix  Avoid nephrotoxins  Goal SBP > 90  Bruce, I/Os  Low K, DM diet  Lokelma as ordered  K improved w/medical tx  Na Bicarb PO  BMP in am  Overall options are limited and prognosis is poor  DNR/I  Palliative care most appropriate    [phone]
89 y/o [place] Irish Creole speaking female w/ PMHx of HTN, DM II, CVA in  w/ residual aphasia, dysphagia, dementia presented w/ dyspnea and nonbloody diarrhea. Pt was admitted for hypoxic respiratory failure, elevated trop and severe sepsis POA due to acute colitis w/ lactic acidosis. RRT was called this morning for respiratory distress.      MEDICATIONS  (STANDING):  ALBUTerol    90 MICROgram(s) HFA Inhaler 2 Puff(s) Inhalation every 6 hours  collagenase Ointment 1 Application(s) Topical daily  dextrose 40% Gel 15 Gram(s) Oral once  dextrose 5%. 1000 milliLiter(s) (50 mL/Hr) IV Continuous <Continuous>  dextrose 5%. 1000 milliLiter(s) (100 mL/Hr) IV Continuous <Continuous>  dextrose 50% Injectable 25 Gram(s) IV Push once  dextrose 50% Injectable 12.5 Gram(s) IV Push once  dextrose 50% Injectable 25 Gram(s) IV Push once  glucagon  Injectable 1 milliGRAM(s) IntraMuscular once  heparin   Injectable 5000 Unit(s) SubCutaneous every 12 hours  influenza   Vaccine 0.5 milliLiter(s) IntraMuscular once  insulin lispro (ADMELOG) corrective regimen sliding scale   SubCutaneous three times a day before meals  insulin lispro (ADMELOG) corrective regimen sliding scale   SubCutaneous at bedtime  nystatin Powder 1 Application(s) Topical two times a day  piperacillin/tazobactam IVPB.. 3.375 Gram(s) IV Intermittent every 8 hours  risperiDONE   Tablet 0.5 milliGRAM(s) Oral at bedtime  sodium bicarbonate 650 milliGRAM(s) Oral three times a day  sodium zirconium cyclosilicate 10 Gram(s) Oral once    MEDICATIONS  (PRN):  acetaminophen   Tablet .. 650 milliGRAM(s) Oral every 6 hours PRN Moderate Pain (4 - 6)  aluminum hydroxide/magnesium hydroxide/simethicone Suspension 30 milliLiter(s) Oral every 4 hours PRN Dyspepsia  ondansetron Injectable 4 milliGRAM(s) IV Push every 8 hours PRN Nausea and/or Vomiting      Allergies    No Known Allergies    Intolerances        Vital Signs Last 24 Hrs  T(C): 37 (02 Oct 2021 16:30), Max: 37 (02 Oct 2021 16:30)  T(F): 98.6 (02 Oct 2021 16:30), Max: 98.6 (02 Oct 2021 16:30)  HR: 78 (02 Oct 2021 21:17) (68 - 90)  BP: 107/64 (02 Oct 2021 16:30) (99/46 - 111/73)   RR: 18 (02 Oct 2021 16:30) (18 - 20)  SpO2: 99% (02 Oct 2021 21:17) (91% - 100%)    PHYSICAL EXAM:  GENERAL: mild respiratory distress  HEAD:  Atraumatic, Normocephalic  NECK: Supple   NERVOUS SYSTEM:  lethargic  CHEST/LUNG: Dec BS bilaterally w/ increased work of breathing  HEART: Regular rate and rhythm; No murmurs, rubs, or gallops  ABDOMEN: Soft, Nontender, Nondistended; Bowel sounds present  EXTREMITIES:  b/l LE edema      LABS:                        7.3    19.87 )-----------( 90       ( 02 Oct 2021 10:32 )             24.6     10-02    138  |  114<H>  |  37<H>  ----------------------------<  309<H>  6.0<H>   |  16<L>  |  2.27<H>    Ca    9.1      02 Oct 2021 19:53  Phos  5.6     10-02  Mg     1.9     10-02    TPro  5.1<L>  /  Alb  1.5<L>  /  TBili  0.8  /  DBili  x   /  AST  37  /  ALT  17  /  AlkPhos  203<H>  10-01      Urinalysis Basic - ( 01 Oct 2021 00:50 )    Color: Yellow / Appearance: Clear / S.015 / pH: x  Gluc: x / Ketone: Negative  / Bili: Negative / Urobili: 1 mg/dL   Blood: x / Protein: 30 mg/dL / Nitrite: Negative   Leuk Esterase: Negative / RBC: Negative /HPF / WBC 0-2   Sq Epi: x / Non Sq Epi: Occasional / Bacteria: Few      CAPILLARY BLOOD GLUCOSE      POCT Blood Glucose.: 302 mg/dL (02 Oct 2021 21:31)  POCT Blood Glucose.: 334 mg/dL (02 Oct 2021 16:44)  POCT Blood Glucose.: 227 mg/dL (02 Oct 2021 11:18)  POCT Blood Glucose.: 223 mg/dL (02 Oct 2021 09:41)  POCT Blood Glucose.: 215 mg/dL (02 Oct 2021 07:31)      Culture - Urine (collected 01 Oct 2021 08:25)  Source: Catheterized Catheterized  Final Report (02 Oct 2021 06:43):    No growth    Culture - Blood (collected 01 Oct 2021 00:53)  Source: .Blood Blood  Preliminary Report (02 Oct 2021 01:02):    No growth to date.    Culture - Blood (collected 01 Oct 2021 00:52)  Source: .Blood Blood  Preliminary Report (02 Oct 2021 01:02):    No growth to date.      RADIOLOGY & ADDITIONAL TESTS:    10-01-21 @ 07:01  -  10-02-21 @ 07:00  --------------------------------------------------------  IN:    Oral Fluid: 320 mL  Total IN: 320 mL    OUT:  Total OUT: 0 mL    Total NET: 320 mL      
  INTERVAL HPI:    89 female with HTN, DM type 2, CVA in June w/ residual Aphasia, Dysphagia, Cognitive impairment, now bedbound. Presented  to the ED due to worsening SOB and congestion for  few day. Was constipated, reported to have abdominal pain and son  gave her a laxative and she had a few episodes of nonbloody diarrhea. Son noted her more lethargic and panting thus called EMS.   No reported fever, chills, n/v.   Per EMS on arrival SPO2 56% on RA thus placed on 100% NRB.  Admitted with hypoxic Respiratory failure, elevated troponin, suspected sepsis with lactic acidosis. Imaging studies showed Colitis.    OVERNIGHT EVENTS:  More responsive    Vital Signs Last 24 Hrs  T(C): 31 (03 Oct 2021 15:55), Max: 36.9 (03 Oct 2021 11:01)  T(F): 87.8 (03 Oct 2021 15:55), Max: 98.5 (03 Oct 2021 11:01)  HR: 79 (03 Oct 2021 16:55) (76 - 85)  BP: 110/58 (03 Oct 2021 15:55) (94/50 - 123/65)  BP(mean): --  RR: 20 (03 Oct 2021 15:55) (18 - 20)  SpO2: 100% (03 Oct 2021 16:55) (90% - 100%)    PHYSICAL EXAM:  GEN:         Awake, responsive and comfortable.  HEENT:     BIPAP   RESP:       no wheezing.  CVS:          Regular rate and rhythm.     MEDICATIONS  (STANDING):  ALBUTerol    90 MICROgram(s) HFA Inhaler 2 Puff(s) Inhalation every 6 hours  collagenase Ointment 1 Application(s) Topical daily  dextrose 40% Gel 15 Gram(s) Oral once  dextrose 5%. 1000 milliLiter(s) (50 mL/Hr) IV Continuous <Continuous>  dextrose 5%. 1000 milliLiter(s) (100 mL/Hr) IV Continuous <Continuous>  dextrose 50% Injectable 25 Gram(s) IV Push once  dextrose 50% Injectable 12.5 Gram(s) IV Push once  dextrose 50% Injectable 25 Gram(s) IV Push once  glucagon  Injectable 1 milliGRAM(s) IntraMuscular once  heparin   Injectable 5000 Unit(s) SubCutaneous every 12 hours  influenza   Vaccine 0.5 milliLiter(s) IntraMuscular once  insulin lispro (ADMELOG) corrective regimen sliding scale   SubCutaneous three times a day before meals  insulin lispro (ADMELOG) corrective regimen sliding scale   SubCutaneous at bedtime  lactulose Retention Enema 200 Gram(s) Rectal once  meropenem  IVPB      nystatin Powder 1 Application(s) Topical two times a day  risperiDONE   Tablet 0.5 milliGRAM(s) Oral at bedtime  sodium bicarbonate 650 milliGRAM(s) Oral three times a day  sodium zirconium cyclosilicate 10 Gram(s) Oral three times a day    MEDICATIONS  (PRN):  acetaminophen   Tablet .. 650 milliGRAM(s) Oral every 6 hours PRN Moderate Pain (4 - 6)  ondansetron Injectable 4 milliGRAM(s) IV Push every 8 hours PRN Nausea and/or Vomiting    LABS:                        7.3    32.12 )-----------( 89       ( 03 Oct 2021 08:55 )             25.1     10-03    139  |  115<H>  |  41<H>  ----------------------------<  223<H>  6.4<HH>   |  14<L>  |  2.61<H>    Ca    9.4      03 Oct 2021 08:55  Phos  7.1     10-03  Mg     2.2     10-03    TPro  5.4<L>  /  Alb  1.6<L>  /  TBili  0.7  /  DBili  x   /  AST  48<H>  /  ALT  19  /  AlkPhos  204<H>  10-03    ASSESSMENT AND PLAN:  ·	Acute hypoxic Respiratory failure.  ·	Acute on chronic diastolic CHF.  ·	Bilateral pleural effusion.  ·	Elevated troponin.  ·	Leukocytosis,  ·	Anemia.  ·	Renal Insuffiencey.  ·	Acute metabolic Encephalopathy.  ·	CVA with aphasia,    More responsive and better oxygenation today.  Can try on nasal O2 tomorrow.    
89 y/o [place] Creole speaking female w/ PMHx of HTN, DM II, CVA in  w/ residual aphasia, dysphagia, dementia presented w/ dyspnea and nonbloody diarrhea. Pt was admitted for hypoxic respiratory failure, elevated trop and severe sepsis POA due to acute colitis w/ lactic acidosis. She is lying in bed in NAD.    MEDICATIONS  (STANDING):  collagenase Ointment 1 Application(s) Topical daily  dextrose 40% Gel 15 Gram(s) Oral once  dextrose 5%. 1000 milliLiter(s) (50 mL/Hr) IV Continuous <Continuous>  dextrose 5%. 1000 milliLiter(s) (100 mL/Hr) IV Continuous <Continuous>  dextrose 50% Injectable 25 Gram(s) IV Push once  dextrose 50% Injectable 12.5 Gram(s) IV Push once  dextrose 50% Injectable 25 Gram(s) IV Push once  famotidine    Tablet 20 milliGRAM(s) Oral daily  glucagon  Injectable 1 milliGRAM(s) IntraMuscular once  heparin   Injectable 5000 Unit(s) SubCutaneous every 12 hours  influenza   Vaccine 0.5 milliLiter(s) IntraMuscular once  insulin lispro (ADMELOG) corrective regimen sliding scale   SubCutaneous three times a day before meals  insulin lispro (ADMELOG) corrective regimen sliding scale   SubCutaneous at bedtime  nystatin Powder 1 Application(s) Topical two times a day  piperacillin/tazobactam IVPB.. 3.375 Gram(s) IV Intermittent every 8 hours  risperiDONE   Tablet 0.5 milliGRAM(s) Oral at bedtime    MEDICATIONS  (PRN):  acetaminophen   Tablet .. 650 milliGRAM(s) Oral every 6 hours PRN Moderate Pain (4 - 6)  aluminum hydroxide/magnesium hydroxide/simethicone Suspension 30 milliLiter(s) Oral every 4 hours PRN Dyspepsia  ondansetron Injectable 4 milliGRAM(s) IV Push every 8 hours PRN Nausea and/or Vomiting      Allergies    No Known Allergies    Intolerances        Vital Signs Last 24 Hrs  T(C): 36.7 (01 Oct 2021 16:20), Max: 36.7 (01 Oct 2021 16:20)  T(F): 98 (01 Oct 2021 16:20), Max: 98 (01 Oct 2021 16:20)  HR: 88 (01 Oct 2021 16:20) (70 - 88)  BP: 101/39 (01 Oct 2021 16:20) (95/44 - 116/68)  BP(mean): --  RR: 18 (01 Oct 2021 16:20) (18 - 20)  SpO2: 94% (01 Oct 2021 16:20) (94% - 100%)    PHYSICAL EXAM:  GENERAL: NAD, well-groomed, well-developed  HEAD:  Atraumatic, Normocephalic  EYES: EOMI, PERRLA, conjunctiva and sclera clear  ENMT: No tonsillar erythema, exudates, or enlargement; Moist mucous membranes, Good dentition, No lesions  NECK: Supple, No JVD, Normal thyroid  NERVOUS SYSTEM:  Alert & confused  CHEST/LUNG: Dec BS bilaterally   HEART: Regular rate and rhythm; No murmurs, rubs, or gallops  ABDOMEN: Soft, Nontender, Nondistended; Bowel sounds present  EXTREMITIES:  b/l LE edema     LABS:                        7.8    18.11 )-----------( 88       ( 01 Oct 2021 08:50 )             26.1     10-01    139  |  116<H>  |  25<H>  ----------------------------<  131<H>  4.6   |  16<L>  |  0.99    Ca    8.9      01 Oct 2021 08:50  Phos  4.2     10-01  Mg     1.9     10-    TPro  5.1<L>  /  Alb  1.5<L>  /  TBili  0.8  /  DBili  x   /  AST  37  /  ALT  17  /  AlkPhos  203<H>  10-01      Urinalysis Basic - ( 01 Oct 2021 00:50 )    Color: Yellow / Appearance: Clear / S.015 / pH: x  Gluc: x / Ketone: Negative  / Bili: Negative / Urobili: 1 mg/dL   Blood: x / Protein: 30 mg/dL / Nitrite: Negative   Leuk Esterase: Negative / RBC: Negative /HPF / WBC 0-2   Sq Epi: x / Non Sq Epi: Occasional / Bacteria: Few      CAPILLARY BLOOD GLUCOSE      POCT Blood Glucose.: 215 mg/dL (01 Oct 2021 16:33)  POCT Blood Glucose.: 143 mg/dL (01 Oct 2021 11:07)  POCT Blood Glucose.: 164 mg/dL (01 Oct 2021 06:07)  POCT Blood Glucose.: 221 mg/dL (30 Sep 2021 23:49)      RADIOLOGY & ADDITIONAL TESTS:    10-01-21 @ 07:01  -  10-01-21 @ 20:46  --------------------------------------------------------  IN:    Oral Fluid: 320 mL  Total IN: 320 mL    OUT:  Total OUT: 0 mL    Total NET: 320 mL

## 2021-10-03 NOTE — PROGRESS NOTE ADULT - ASSESSMENT
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 Pt was admitted for hypoxic respiratory failure, elevated trop and severe sepsis POA due to acute colitis w/ lactic acidosis.     Hypoxia  - likely due to pleural effusions, possible acute on chronic diastolic CHF   - CT showed small to moderate right and small left pleural effusions with adjacent atelectasis   - Echo showed EF 65-70% w/ a moderate pleural effusion in the left lateral region, degenerative tricuspid valve. Echo was consistent with left ventricular concentric remodeling  - c/w supplemental oxygen  - chest PT  - give Lasix despite rising Cr because of very poor respiratory status and put patient on BiPAP  - pulm note read and appreciated     MAUREEN   - likely due to lasix    hyperkalemia  - c/w bicarb & give Kayexalate      Colitis  - CT abd showed prominent ascending and proximal descending colon c/w for mild colitis  - lactic acidosis has resolved  - c/w Zosyn  - f/u stool and blood cultures    Elevated trop  - trop peaked at 0.153  - likely due to demand ischemia     CVA w/ residual aphasia, dysphagia & functional quadriplegia   - pt on risperidone  - pt bedbound w/ sacral ulcer stage III POA, turn and reposition q2 - PT wound consulted  - pain control w/ PRN Tylenol    Anemia, thrombocytopenia  - Per son, no known hx, no reported BRBPR on melena  - guaiac neg  - f/u anemia panel  - cont to monitor     DM II  - c/w SSI  - f/u A1c is 6.5    HTN  - hold antihypertensives for borderline BP    DVT: Heparin    GI: Pepcid      Code Status: DNR/DNI    Son Nelson who is HCP, pt , MOLST in chart [phone]. I spoke w/ him at bedside today and informed him that the patient has a very poor prognosis. 
89 y/o [place] Creole speaking female w/ PMHx of HTN, DM II, CVA in June w/ residual aphasia, dysphagia, dementia presented w/ dyspnea and nonbloody diarrhea. Pt was admitted for hypoxic respiratory failure, elevated trop and severe sepsis POA due to acute colitis w/ lactic acidosis.     Hypoxia  - likely due to pleural effusions, possible acute on chronic diastolic CHF   - CT showed small to moderate right and small left pleural effusions with adjacent atelectasis   - Echo showed EF 65-70% w/ a moderate pleural effusion in the left lateral region, degenerative tricuspid valve. Echo was consistent with left ventricular concentric remodeling  - c/w supplemental oxygen  - chest PT  - give Lasix despite rising Cr because of very poor respiratory status and put patient on BiPAP  - pulm note read and appreciated   - as per ID, antibiotics switched to Merrem for PNA    metabolic/ infectious encephalopathy w/ underlying dementia  - likely due to infection and MAUREEN    Dysphagia  - due to encephalopathy   - will give gentle IVF as patient is not eating  - will get swallow eval once encephalopathy improves     MAUREEN   - likely due to Lasix    hyperkalemia  - improved w/ Kayexalate today, will continue to watch     Colitis  - CT abd showed prominent ascending and proximal descending colon c/w for mild colitis  - lactic acidosis has resolved  - as per ID, antibiotics switched to Merrem   - f/u stool and blood cultures    Elevated trop  - trop peaked at 0.153  - likely due to demand ischemia     CVA w/ residual aphasia, dysphagia & functional quadriplegia   - pt on risperidone  - pt bedbound w/ sacral ulcer stage III POA, turn and reposition q2 - PT wound consulted  - pain control w/ PRN Tylenol    Anemia, thrombocytopenia  - Per son, no known hx, no reported BRBPR on melena  - guaiac neg  - f/u anemia panel  - cont to monitor     DM II  - c/w SSI  - f/u A1c is 6.5    HTN  - hold antihypertensives for borderline BP    DVT: Heparin    GI: Pepcid      Code Status: DNR/DNI    Son Nelson who is HCP, pt , MOLST in chart [phone]. I spoke w/ him at bedside today and informed him that the patient has a very poor prognosis. 
89 y/o Citizen of the [place] Creole speaking female w/ PMHx of HTN, DM II, CVA in June w/ residual aphasia, dysphagia, dementia presented w/ dyspnea and nonbloody diarrhea. Pt was admitted for hypoxic respiratory failure, elevated trop and severe sepsis POA due to acute colitis w/ lactic acidosis.     Hypoxia  - likely due to pleural effusions, possible acute on chronic diastolic CHF   - CT showed small to moderate right and small left pleural effusions with adjacent atelectasis   - Echo showed EF 65-70% w/ a moderate pleural effusion in the left lateral region, degenerative tricuspid valve. Echo was consistent with left ventricular concentric remodeling  - c/w supplemental oxygen  - chest PT  - start Lasix      Colitis  - CT abd showed prominent ascending and proximal descending colon c/w for mild colitis  - lactic acidosis has resolved  - c/w Zosyn  - f/u stool and blood cultures    Elevated trop  - trop peaked at 0.153  - likely due to demand ischemia     CVA w/ residual aphasia, dysphagia & functional quadriplegia   - pt on risperidone  - pt bedbound w/ sacral ulcer stage III POA, turn and reposition q2 - PT wound consulted  - pain control w/ PRN Tylenol    Anemia, thrombocytopenia  - Per son, no known hx, no reported BRBPR on melena  - guaiac neg  - f/u anemia panel  - cont to monitor     DM II  - c/w SSI  - f/u A1c is 6.5    HTN  - hold antihypertensives for borderline BP    DVT: Heparin    GI: Pepcid      Code Status: DNR/DNI    Son Nleson who is HCP, pt , MOLST in chart [phone]

## 2021-10-03 NOTE — PROGRESS NOTE ADULT - REASON FOR ADMISSION
Sepsis due to Colitis, Hypoxia, Pleural Effusions

## 2021-10-03 NOTE — CHART NOTE - NSCHARTNOTEFT_GEN_A_CORE
House- Medicine NP:    Asked by Ann KEEN for IV insertion. Multiple RNs unsuccesful after several attempts.   Patient is a 89y old  Female who presents with a chief complaint of Sepsis due to Colitis, Hypoxia, Pleural Effusions (02 Oct 2021 22:53)      T(F): 98.5 (10-03-21 @ 11:01)  HR: 82 (10-03-21 @ 12:22)  BP: 94/50 (10-03-21 @ 11:01)  RR: 20 (10-03-21 @ 11:01)  SpO2: 95% (10-03-21 @ 12:22)  Wt(kg): --    IV inserted to L upper extremity guage #18 via [employer] guidance. + blood return, flushes well. Pt tolerated procedure well.

## 2021-10-04 NOTE — GOALS OF CARE CONVERSATION - ADVANCED CARE PLANNING - CONVERSATION DETAILS
Pt becoming hypotensive and with worsening respiratory status this morning. Unable to obtain peripheral IV access, multiple attempts made under ultrasound guidance however pt very edematous. Called pt's son Nelson to inform him of his mother's deteriorating condition, and that she appears to be in the process of dying. Nelson expresses understanding of the situation and expresses his desire to allow her to pass peacefully without any further interventions. Explained to him that we will then transition to comfort measures only at this time, to which he agrees. States he will be coming in to visit his mother at bedside this morning. Pt becoming hypotensive and with worsening respiratory status this morning. Unable to obtain peripheral IV access, multiple attempts made under ultrasound guidance however pt very edematous. Pt currently unable to tolerate PO meds given poor mentation. Called pt's son Nelson to inform him of his mother's deteriorating condition, and that she appears to be in the process of dying. Nelson expresses understanding of the situation and expresses his desire to allow her to pass peacefully without any further interventions. Explained to him that we will then transition to comfort measures only at this time, to which he agrees. States he will be coming in to visit his mother at bedside this morning.

## 2021-10-04 NOTE — CONSULT NOTE ADULT - REASON FOR ADMISSION
Sepsis due to Colitis, Hypoxia, Pleural Effusions

## 2021-10-04 NOTE — DISCHARGE NOTE FOR THE EXPIRED PATIENT - HOSPITAL COURSE
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 Pt was admitted for hypoxic respiratory failure, elevated trop and severe sepsis POA due to acute colitis w/ lactic acidosis.     Hypoxia  - likely due to pleural effusions, possible acute on chronic diastolic CHF   - CT showed small to moderate right and small left pleural effusions with adjacent atelectasis   - Echo showed EF 65-70% w/ a moderate pleural effusion in the left lateral region, degenerative tricuspid valve. Echo was consistent with left ventricular concentric remodeling  - c/w supplemental oxygen  - chest PT  - give Lasix despite rising Cr because of very poor respiratory status and put patient on BiPAP  - pulm note read and appreciated   - as per ID, antibiotics switched to Merrem for PNA    metabolic/ infectious encephalopathy w/ underlying dementia  - likely due to infection and MAUREEN    Dysphagia  - due to encephalopathy   - will give gentle IVF as patient is not eating  - will get swallow eval once encephalopathy improves     MAUREEN   - likely due to Lasix    hyperkalemia  - improved w/ Kayexalate today, will continue to watch     Colitis  - CT abd showed prominent ascending and proximal descending colon c/w for mild colitis  - lactic acidosis has resolved  - as per ID, antibiotics switched to Merrem   - f/u stool and blood cultures    Elevated trop  - trop peaked at 0.153  - likely due to demand ischemia     CVA w/ residual aphasia, dysphagia & functional quadriplegia   - pt on risperidone  - pt bedbound w/ sacral ulcer stage III POA, turn and reposition q2 - PT wound consulted  - pain control w/ PRN Tylenol    Anemia, thrombocytopenia  - Per son, no known hx, no reported BRBPR on melena  - guaiac neg  - f/u anemia panel  - cont to monitor     DM II  - c/w SSI  - f/u A1c is 6.5    HTN  - hold antihypertensives for borderline BP    DVT: Heparin    GI: Pepcid      Code Status: DNR/DNI    Overnight on 10/4, patient hypotensive in dying stage. Family  made aware. comfort measures only. Patient  on 10/4, pronounced by Dr. Knapp.

## 2021-10-04 NOTE — CONSULT NOTE ADULT - SUBJECTIVE AND OBJECTIVE BOX
Asked to see pt by staff, family at bedside=- son Nelson and his wife. Pt is 89 years old in active dying process on BIPAP with request for full comfort care. Discussed with Nelson that we can order medications for comfort and remove BIPAP. This was done and pt passed away shortly thereafter.  in room with family at time of death.

## 2021-10-05 LAB
CULTURE RESULTS: SIGNIFICANT CHANGE UP
CULTURE RESULTS: SIGNIFICANT CHANGE UP
SPECIMEN SOURCE: SIGNIFICANT CHANGE UP
SPECIMEN SOURCE: SIGNIFICANT CHANGE UP

## 2021-10-14 DIAGNOSIS — A41.9 SEPSIS, UNSPECIFIED ORGANISM: ICD-10-CM

## 2021-10-14 DIAGNOSIS — I69.320 APHASIA FOLLOWING CEREBRAL INFARCTION: ICD-10-CM

## 2021-10-14 DIAGNOSIS — I69.391 DYSPHAGIA FOLLOWING CEREBRAL INFARCTION: ICD-10-CM

## 2021-10-14 DIAGNOSIS — Z79.84 LONG TERM (CURRENT) USE OF ORAL HYPOGLYCEMIC DRUGS: ICD-10-CM

## 2021-10-14 DIAGNOSIS — J96.01 ACUTE RESPIRATORY FAILURE WITH HYPOXIA: ICD-10-CM

## 2021-10-14 DIAGNOSIS — K52.9 NONINFECTIVE GASTROENTERITIS AND COLITIS, UNSPECIFIED: ICD-10-CM

## 2021-10-14 DIAGNOSIS — I11.0 HYPERTENSIVE HEART DISEASE WITH HEART FAILURE: ICD-10-CM

## 2021-10-14 DIAGNOSIS — E87.2 ACIDOSIS: ICD-10-CM

## 2021-10-14 DIAGNOSIS — G93.41 METABOLIC ENCEPHALOPATHY: ICD-10-CM

## 2021-10-14 DIAGNOSIS — F03.90 UNSPECIFIED DEMENTIA, UNSPECIFIED SEVERITY, WITHOUT BEHAVIORAL DISTURBANCE, PSYCHOTIC DISTURBANCE, MOOD DISTURBANCE, AND ANXIETY: ICD-10-CM

## 2021-10-14 DIAGNOSIS — Z51.5 ENCOUNTER FOR PALLIATIVE CARE: ICD-10-CM

## 2021-10-14 DIAGNOSIS — L89.153 PRESSURE ULCER OF SACRAL REGION, STAGE 3: ICD-10-CM

## 2021-10-14 DIAGNOSIS — I24.8 OTHER FORMS OF ACUTE ISCHEMIC HEART DISEASE: ICD-10-CM

## 2021-10-14 DIAGNOSIS — R65.20 SEVERE SEPSIS WITHOUT SEPTIC SHOCK: ICD-10-CM

## 2021-10-14 DIAGNOSIS — E11.9 TYPE 2 DIABETES MELLITUS WITHOUT COMPLICATIONS: ICD-10-CM

## 2021-10-14 DIAGNOSIS — R53.2 FUNCTIONAL QUADRIPLEGIA: ICD-10-CM

## 2021-10-14 DIAGNOSIS — N17.9 ACUTE KIDNEY FAILURE, UNSPECIFIED: ICD-10-CM

## 2021-10-14 DIAGNOSIS — D64.9 ANEMIA, UNSPECIFIED: ICD-10-CM

## 2021-10-14 DIAGNOSIS — E87.5 HYPERKALEMIA: ICD-10-CM

## 2021-10-14 DIAGNOSIS — D69.6 THROMBOCYTOPENIA, UNSPECIFIED: ICD-10-CM

## 2021-10-14 DIAGNOSIS — I50.33 ACUTE ON CHRONIC DIASTOLIC (CONGESTIVE) HEART FAILURE: ICD-10-CM

## 2021-10-14 DIAGNOSIS — Z66 DO NOT RESUSCITATE: ICD-10-CM

## 2021-10-30 RX ADMIN — ALBUTEROL 2 PUFF(S): 90 AEROSOL, METERED ORAL at 22:33

## 2022-07-16 NOTE — ED PROVIDER NOTE - INTERNATIONAL TRAVEL
Received notification from On Call service patient experienced diarrhea all night.  Please notify she needs to push fluids, try bland foods.  Do not take imodium or anything to stop diarrhea.  If she feels she is getting dehydrated or has severe abdominal pain, she needs to proceed to the ER.    No
